# Patient Record
Sex: MALE | Race: WHITE | Employment: OTHER | URBAN - METROPOLITAN AREA
[De-identification: names, ages, dates, MRNs, and addresses within clinical notes are randomized per-mention and may not be internally consistent; named-entity substitution may affect disease eponyms.]

---

## 2017-06-06 ENCOUNTER — OFFICE VISIT (OUTPATIENT)
Dept: INTERNAL MEDICINE CLINIC | Age: 52
End: 2017-06-06

## 2017-06-06 VITALS
SYSTOLIC BLOOD PRESSURE: 126 MMHG | WEIGHT: 249.6 LBS | DIASTOLIC BLOOD PRESSURE: 74 MMHG | HEIGHT: 70 IN | HEART RATE: 60 BPM | BODY MASS INDEX: 35.73 KG/M2

## 2017-06-06 DIAGNOSIS — R73.09 ABNORMAL GLUCOSE: ICD-10-CM

## 2017-06-06 DIAGNOSIS — Z23 NEED FOR PROPHYLACTIC VACCINATION AGAINST DIPHTHERIA-TETANUS-PERTUSSIS (DTP): ICD-10-CM

## 2017-06-06 DIAGNOSIS — I10 ESSENTIAL HYPERTENSION, BENIGN: Primary | ICD-10-CM

## 2017-06-06 DIAGNOSIS — E78.00 HYPERCHOLESTEREMIA: ICD-10-CM

## 2017-06-06 LAB
ALBUMIN SERPL-MCNC: 4.7 G/DL (ref 3.4–5)
ANION GAP SERPL CALCULATED.3IONS-SCNC: 15 MMOL/L (ref 3–16)
BUN BLDV-MCNC: 18 MG/DL (ref 7–20)
CALCIUM SERPL-MCNC: 9.7 MG/DL (ref 8.3–10.6)
CHLORIDE BLD-SCNC: 100 MMOL/L (ref 99–110)
CHOLESTEROL, TOTAL: 164 MG/DL (ref 0–199)
CO2: 24 MMOL/L (ref 21–32)
CREAT SERPL-MCNC: 0.8 MG/DL (ref 0.9–1.3)
GFR AFRICAN AMERICAN: >60
GFR NON-AFRICAN AMERICAN: >60
GLUCOSE BLD-MCNC: 96 MG/DL (ref 70–99)
HDLC SERPL-MCNC: 35 MG/DL (ref 40–60)
LDL CHOLESTEROL CALCULATED: 101 MG/DL
PHOSPHORUS: 2.7 MG/DL (ref 2.5–4.9)
POTASSIUM SERPL-SCNC: 4.8 MMOL/L (ref 3.5–5.1)
SODIUM BLD-SCNC: 139 MMOL/L (ref 136–145)
TRIGL SERPL-MCNC: 142 MG/DL (ref 0–150)
VLDLC SERPL CALC-MCNC: 28 MG/DL

## 2017-06-06 PROCEDURE — 90471 IMMUNIZATION ADMIN: CPT | Performed by: INTERNAL MEDICINE

## 2017-06-06 PROCEDURE — 3017F COLORECTAL CA SCREEN DOC REV: CPT | Performed by: INTERNAL MEDICINE

## 2017-06-06 PROCEDURE — G8427 DOCREV CUR MEDS BY ELIG CLIN: HCPCS | Performed by: INTERNAL MEDICINE

## 2017-06-06 PROCEDURE — 99214 OFFICE O/P EST MOD 30 MIN: CPT | Performed by: INTERNAL MEDICINE

## 2017-06-06 PROCEDURE — 4004F PT TOBACCO SCREEN RCVD TLK: CPT | Performed by: INTERNAL MEDICINE

## 2017-06-06 PROCEDURE — 90715 TDAP VACCINE 7 YRS/> IM: CPT | Performed by: INTERNAL MEDICINE

## 2017-06-06 PROCEDURE — G8419 CALC BMI OUT NRM PARAM NOF/U: HCPCS | Performed by: INTERNAL MEDICINE

## 2017-06-06 RX ORDER — LISINOPRIL 10 MG/1
10 TABLET ORAL DAILY
Qty: 30 TABLET | Refills: 5 | Status: SHIPPED | OUTPATIENT
Start: 2017-06-06 | End: 2017-11-02 | Stop reason: SDUPTHER

## 2017-06-06 RX ORDER — DOCUSATE SODIUM 100 MG/1
100 CAPSULE, LIQUID FILLED ORAL EVERY OTHER DAY
COMMUNITY

## 2017-06-06 RX ORDER — ATORVASTATIN CALCIUM 20 MG/1
20 TABLET, FILM COATED ORAL NIGHTLY
Qty: 30 TABLET | Refills: 3 | Status: SHIPPED | OUTPATIENT
Start: 2017-06-06 | End: 2017-08-31 | Stop reason: SDUPTHER

## 2017-06-06 ASSESSMENT — PATIENT HEALTH QUESTIONNAIRE - PHQ9
SUM OF ALL RESPONSES TO PHQ9 QUESTIONS 1 & 2: 1
SUM OF ALL RESPONSES TO PHQ QUESTIONS 1-9: 1
1. LITTLE INTEREST OR PLEASURE IN DOING THINGS: 0
2. FEELING DOWN, DEPRESSED OR HOPELESS: 1
SUM OF ALL RESPONSES TO PHQ QUESTIONS 1-9: 1
2. FEELING DOWN, DEPRESSED OR HOPELESS: 1
SUM OF ALL RESPONSES TO PHQ9 QUESTIONS 1 & 2: 1
1. LITTLE INTEREST OR PLEASURE IN DOING THINGS: 0

## 2017-06-07 LAB
ESTIMATED AVERAGE GLUCOSE: 119.8 MG/DL
HBA1C MFR BLD: 5.8 %

## 2017-08-31 RX ORDER — ATORVASTATIN CALCIUM 20 MG/1
TABLET, FILM COATED ORAL
Qty: 30 TABLET | Refills: 2 | Status: SHIPPED | OUTPATIENT
Start: 2017-08-31 | End: 2017-12-12 | Stop reason: SDUPTHER

## 2017-10-10 PROCEDURE — G0008 ADMIN INFLUENZA VIRUS VAC: HCPCS | Performed by: INTERNAL MEDICINE

## 2017-10-10 PROCEDURE — 90686 IIV4 VACC NO PRSV 0.5 ML IM: CPT | Performed by: INTERNAL MEDICINE

## 2017-10-31 ENCOUNTER — IMMUNIZATION (OUTPATIENT)
Dept: INTERNAL MEDICINE CLINIC | Age: 52
End: 2017-10-31

## 2017-11-02 RX ORDER — LISINOPRIL 10 MG/1
10 TABLET ORAL DAILY
Qty: 30 TABLET | Refills: 5 | Status: SHIPPED | OUTPATIENT
Start: 2017-11-02 | End: 2018-05-24 | Stop reason: SDUPTHER

## 2017-12-12 ENCOUNTER — OFFICE VISIT (OUTPATIENT)
Dept: INTERNAL MEDICINE CLINIC | Age: 52
End: 2017-12-12

## 2017-12-12 VITALS
DIASTOLIC BLOOD PRESSURE: 74 MMHG | SYSTOLIC BLOOD PRESSURE: 122 MMHG | HEART RATE: 60 BPM | WEIGHT: 246.8 LBS | BODY MASS INDEX: 35.33 KG/M2 | HEIGHT: 70 IN

## 2017-12-12 DIAGNOSIS — K59.04 CHRONIC IDIOPATHIC CONSTIPATION: ICD-10-CM

## 2017-12-12 DIAGNOSIS — R73.09 ABNORMAL GLUCOSE: ICD-10-CM

## 2017-12-12 DIAGNOSIS — E78.2 MIXED HYPERLIPIDEMIA: ICD-10-CM

## 2017-12-12 DIAGNOSIS — I10 ESSENTIAL HYPERTENSION, BENIGN: Primary | ICD-10-CM

## 2017-12-12 LAB — HBA1C MFR BLD: 6 %

## 2017-12-12 PROCEDURE — 4004F PT TOBACCO SCREEN RCVD TLK: CPT | Performed by: INTERNAL MEDICINE

## 2017-12-12 PROCEDURE — G8484 FLU IMMUNIZE NO ADMIN: HCPCS | Performed by: INTERNAL MEDICINE

## 2017-12-12 PROCEDURE — 99214 OFFICE O/P EST MOD 30 MIN: CPT | Performed by: INTERNAL MEDICINE

## 2017-12-12 PROCEDURE — 3017F COLORECTAL CA SCREEN DOC REV: CPT | Performed by: INTERNAL MEDICINE

## 2017-12-12 PROCEDURE — G8417 CALC BMI ABV UP PARAM F/U: HCPCS | Performed by: INTERNAL MEDICINE

## 2017-12-12 PROCEDURE — 83036 HEMOGLOBIN GLYCOSYLATED A1C: CPT | Performed by: INTERNAL MEDICINE

## 2017-12-12 PROCEDURE — G8427 DOCREV CUR MEDS BY ELIG CLIN: HCPCS | Performed by: INTERNAL MEDICINE

## 2017-12-12 RX ORDER — ATORVASTATIN CALCIUM 20 MG/1
20 TABLET, FILM COATED ORAL DAILY
Qty: 30 TABLET | Refills: 5 | Status: SHIPPED | OUTPATIENT
Start: 2017-12-12 | End: 2018-06-12 | Stop reason: SDUPTHER

## 2017-12-12 NOTE — PROGRESS NOTES
CHOLHDLRATIO   The 10-year ASCVD risk score (Mary Sanchez, et al., 2013) is: 10.2%    Values used to calculate the score:      Age: 46 years      Sex: Male      Is Non- : No      Diabetic: No      Tobacco smoker: Yes      Systolic Blood Pressure: 577 mmHg      Is BP treated: Yes      HDL Cholesterol: 35 mg/dL      Total Cholesterol: 164 mg/dL     Colonoscopy done 6/16/16: 3 polyps removed: 2 adenomata, 1 hyperplastic    A/P:  1. Abnormal glucose  At risk for DM. Continue metformin      2. Essential hypertension, benign  Controlled  The current medical regimen is effective;  continue present plan and medications. BW is UTD    3. Mixed hyperlipidemia  Doing well on atorvastatin  The current medical regimen is effective;  continue present plan and medications. Tobacco cessation advised    4. Chronic idiopathic constipation  Colonoscopy from 2016 is reassuring. Patient Instructions   For constipation take 100mg Colace twice daily and take Miralax (Polyethylene glycol) daily as needed. Also drink lots of water.        RTO 6 months or PRN

## 2018-01-24 ENCOUNTER — OFFICE VISIT (OUTPATIENT)
Dept: INTERNAL MEDICINE CLINIC | Age: 53
End: 2018-01-24

## 2018-01-24 VITALS
TEMPERATURE: 98 F | BODY MASS INDEX: 34.7 KG/M2 | SYSTOLIC BLOOD PRESSURE: 100 MMHG | HEART RATE: 84 BPM | WEIGHT: 242.4 LBS | HEIGHT: 70 IN | DIASTOLIC BLOOD PRESSURE: 78 MMHG

## 2018-01-24 DIAGNOSIS — J00 ACUTE NASOPHARYNGITIS: Primary | ICD-10-CM

## 2018-01-24 DIAGNOSIS — J40 BRONCHITIS: ICD-10-CM

## 2018-01-24 LAB
INFLUENZA A ANTIBODY: NORMAL
INFLUENZA B ANTIBODY: NORMAL

## 2018-01-24 PROCEDURE — G8417 CALC BMI ABV UP PARAM F/U: HCPCS | Performed by: NURSE PRACTITIONER

## 2018-01-24 PROCEDURE — G8427 DOCREV CUR MEDS BY ELIG CLIN: HCPCS | Performed by: NURSE PRACTITIONER

## 2018-01-24 PROCEDURE — 87804 INFLUENZA ASSAY W/OPTIC: CPT | Performed by: NURSE PRACTITIONER

## 2018-01-24 PROCEDURE — 99213 OFFICE O/P EST LOW 20 MIN: CPT | Performed by: NURSE PRACTITIONER

## 2018-01-24 PROCEDURE — 3017F COLORECTAL CA SCREEN DOC REV: CPT | Performed by: NURSE PRACTITIONER

## 2018-01-24 PROCEDURE — 4004F PT TOBACCO SCREEN RCVD TLK: CPT | Performed by: NURSE PRACTITIONER

## 2018-01-24 PROCEDURE — G8484 FLU IMMUNIZE NO ADMIN: HCPCS | Performed by: NURSE PRACTITIONER

## 2018-01-24 RX ORDER — GUAIFENESIN AND CODEINE PHOSPHATE 100; 10 MG/5ML; MG/5ML
10 SOLUTION ORAL 3 TIMES DAILY PRN
Qty: 210 ML | Refills: 0 | Status: SHIPPED | OUTPATIENT
Start: 2018-01-24 | End: 2018-01-31

## 2018-01-24 ASSESSMENT — ENCOUNTER SYMPTOMS
SORE THROAT: 1
COUGH: 1
CHEST TIGHTNESS: 1

## 2018-06-12 ENCOUNTER — OFFICE VISIT (OUTPATIENT)
Dept: INTERNAL MEDICINE CLINIC | Age: 53
End: 2018-06-12

## 2018-06-12 VITALS
HEIGHT: 70 IN | DIASTOLIC BLOOD PRESSURE: 64 MMHG | WEIGHT: 248 LBS | BODY MASS INDEX: 35.5 KG/M2 | HEART RATE: 72 BPM | SYSTOLIC BLOOD PRESSURE: 112 MMHG

## 2018-06-12 DIAGNOSIS — R73.09 ABNORMAL GLUCOSE: Primary | ICD-10-CM

## 2018-06-12 DIAGNOSIS — E78.2 MIXED HYPERLIPIDEMIA: ICD-10-CM

## 2018-06-12 DIAGNOSIS — I10 ESSENTIAL HYPERTENSION, BENIGN: ICD-10-CM

## 2018-06-12 LAB
ALBUMIN SERPL-MCNC: 4.7 G/DL (ref 3.4–5)
ANION GAP SERPL CALCULATED.3IONS-SCNC: 17 MMOL/L (ref 3–16)
BUN BLDV-MCNC: 21 MG/DL (ref 7–20)
CALCIUM SERPL-MCNC: 9.7 MG/DL (ref 8.3–10.6)
CHLORIDE BLD-SCNC: 98 MMOL/L (ref 99–110)
CHOLESTEROL, TOTAL: 107 MG/DL (ref 0–199)
CO2: 22 MMOL/L (ref 21–32)
CREAT SERPL-MCNC: 1 MG/DL (ref 0.9–1.3)
GFR AFRICAN AMERICAN: >60
GFR NON-AFRICAN AMERICAN: >60
GLUCOSE BLD-MCNC: 134 MG/DL (ref 70–99)
HDLC SERPL-MCNC: 38 MG/DL (ref 40–60)
LDL CHOLESTEROL CALCULATED: 48 MG/DL
PHOSPHORUS: 2.2 MG/DL (ref 2.5–4.9)
POTASSIUM SERPL-SCNC: 5.1 MMOL/L (ref 3.5–5.1)
SODIUM BLD-SCNC: 137 MMOL/L (ref 136–145)
TRIGL SERPL-MCNC: 103 MG/DL (ref 0–150)
VLDLC SERPL CALC-MCNC: 21 MG/DL

## 2018-06-12 PROCEDURE — G8417 CALC BMI ABV UP PARAM F/U: HCPCS | Performed by: INTERNAL MEDICINE

## 2018-06-12 PROCEDURE — G8427 DOCREV CUR MEDS BY ELIG CLIN: HCPCS | Performed by: INTERNAL MEDICINE

## 2018-06-12 PROCEDURE — 4004F PT TOBACCO SCREEN RCVD TLK: CPT | Performed by: INTERNAL MEDICINE

## 2018-06-12 PROCEDURE — 99214 OFFICE O/P EST MOD 30 MIN: CPT | Performed by: INTERNAL MEDICINE

## 2018-06-12 PROCEDURE — 3017F COLORECTAL CA SCREEN DOC REV: CPT | Performed by: INTERNAL MEDICINE

## 2018-06-12 RX ORDER — LISINOPRIL 10 MG/1
10 TABLET ORAL DAILY
Qty: 30 TABLET | Refills: 5 | Status: SHIPPED | OUTPATIENT
Start: 2018-06-12 | End: 2019-01-04 | Stop reason: SDUPTHER

## 2018-06-12 RX ORDER — ATORVASTATIN CALCIUM 20 MG/1
20 TABLET, FILM COATED ORAL DAILY
Qty: 30 TABLET | Refills: 5 | Status: SHIPPED | OUTPATIENT
Start: 2018-06-12 | End: 2019-01-04 | Stop reason: SDUPTHER

## 2018-06-13 LAB
ESTIMATED AVERAGE GLUCOSE: 128.4 MG/DL
HBA1C MFR BLD: 6.1 %

## 2018-08-28 ENCOUNTER — NURSE ONLY (OUTPATIENT)
Dept: INTERNAL MEDICINE CLINIC | Age: 53
End: 2018-08-28

## 2018-08-28 DIAGNOSIS — Z23 NEED FOR SHINGLES VACCINE: Primary | ICD-10-CM

## 2018-10-29 ENCOUNTER — IMMUNIZATION (OUTPATIENT)
Dept: INTERNAL MEDICINE CLINIC | Age: 53
End: 2018-10-29
Payer: MEDICARE

## 2018-10-29 DIAGNOSIS — Z23 NEED FOR INFLUENZA VACCINATION: Primary | ICD-10-CM

## 2018-10-29 PROCEDURE — 90682 RIV4 VACC RECOMBINANT DNA IM: CPT | Performed by: INTERNAL MEDICINE

## 2018-10-29 PROCEDURE — G0008 ADMIN INFLUENZA VIRUS VAC: HCPCS | Performed by: INTERNAL MEDICINE

## 2018-11-09 ENCOUNTER — NURSE ONLY (OUTPATIENT)
Dept: INTERNAL MEDICINE CLINIC | Age: 53
End: 2018-11-09
Payer: MEDICARE

## 2018-11-09 DIAGNOSIS — Z23 NEED FOR SHINGLES VACCINE: Primary | ICD-10-CM

## 2018-11-09 PROCEDURE — 90471 IMMUNIZATION ADMIN: CPT | Performed by: INTERNAL MEDICINE

## 2018-11-09 PROCEDURE — 90750 HZV VACC RECOMBINANT IM: CPT | Performed by: INTERNAL MEDICINE

## 2018-12-03 ENCOUNTER — TELEPHONE (OUTPATIENT)
Dept: INTERNAL MEDICINE CLINIC | Age: 53
End: 2018-12-03

## 2019-01-04 RX ORDER — LISINOPRIL 10 MG/1
TABLET ORAL
Qty: 30 TABLET | Refills: 5 | Status: SHIPPED | OUTPATIENT
Start: 2019-01-04 | End: 2019-06-27 | Stop reason: SDUPTHER

## 2019-01-04 RX ORDER — ATORVASTATIN CALCIUM 20 MG/1
TABLET, FILM COATED ORAL
Qty: 30 TABLET | Refills: 5 | Status: SHIPPED | OUTPATIENT
Start: 2019-01-04 | End: 2019-06-27 | Stop reason: SDUPTHER

## 2019-01-08 ENCOUNTER — OFFICE VISIT (OUTPATIENT)
Dept: INTERNAL MEDICINE CLINIC | Age: 54
End: 2019-01-08
Payer: MEDICARE

## 2019-01-08 VITALS
HEIGHT: 70 IN | SYSTOLIC BLOOD PRESSURE: 122 MMHG | DIASTOLIC BLOOD PRESSURE: 80 MMHG | WEIGHT: 250.8 LBS | BODY MASS INDEX: 35.9 KG/M2 | HEART RATE: 68 BPM

## 2019-01-08 DIAGNOSIS — E78.2 MIXED HYPERLIPIDEMIA: ICD-10-CM

## 2019-01-08 DIAGNOSIS — R73.09 ABNORMAL GLUCOSE: Primary | ICD-10-CM

## 2019-01-08 DIAGNOSIS — I10 ESSENTIAL HYPERTENSION, BENIGN: ICD-10-CM

## 2019-01-08 LAB — HBA1C MFR BLD: 6.2 %

## 2019-01-08 PROCEDURE — G8482 FLU IMMUNIZE ORDER/ADMIN: HCPCS | Performed by: INTERNAL MEDICINE

## 2019-01-08 PROCEDURE — G8417 CALC BMI ABV UP PARAM F/U: HCPCS | Performed by: INTERNAL MEDICINE

## 2019-01-08 PROCEDURE — 99214 OFFICE O/P EST MOD 30 MIN: CPT | Performed by: INTERNAL MEDICINE

## 2019-01-08 PROCEDURE — 83036 HEMOGLOBIN GLYCOSYLATED A1C: CPT | Performed by: INTERNAL MEDICINE

## 2019-01-08 PROCEDURE — 4004F PT TOBACCO SCREEN RCVD TLK: CPT | Performed by: INTERNAL MEDICINE

## 2019-01-08 PROCEDURE — G8427 DOCREV CUR MEDS BY ELIG CLIN: HCPCS | Performed by: INTERNAL MEDICINE

## 2019-01-08 PROCEDURE — G8510 SCR DEP NEG, NO PLAN REQD: HCPCS | Performed by: INTERNAL MEDICINE

## 2019-01-08 PROCEDURE — 3017F COLORECTAL CA SCREEN DOC REV: CPT | Performed by: INTERNAL MEDICINE

## 2019-01-08 ASSESSMENT — PATIENT HEALTH QUESTIONNAIRE - PHQ9
2. FEELING DOWN, DEPRESSED OR HOPELESS: 1
SUM OF ALL RESPONSES TO PHQ9 QUESTIONS 1 & 2: 1
SUM OF ALL RESPONSES TO PHQ QUESTIONS 1-9: 1
1. LITTLE INTEREST OR PLEASURE IN DOING THINGS: 0
SUM OF ALL RESPONSES TO PHQ QUESTIONS 1-9: 1

## 2019-05-03 ENCOUNTER — HOSPITAL ENCOUNTER (OUTPATIENT)
Age: 54
Discharge: HOME | End: 2019-05-03
Payer: MEDICARE

## 2019-05-03 ENCOUNTER — HOSPITAL ENCOUNTER (OUTPATIENT)
Dept: GENERAL RADIOLOGY | Age: 54
Discharge: HOME | End: 2019-05-03
Payer: MEDICARE

## 2019-05-03 ENCOUNTER — OFFICE VISIT (OUTPATIENT)
Dept: INTERNAL MEDICINE CLINIC | Age: 54
End: 2019-05-03
Payer: MEDICARE

## 2019-05-03 VITALS
SYSTOLIC BLOOD PRESSURE: 130 MMHG | BODY MASS INDEX: 36.94 KG/M2 | HEART RATE: 80 BPM | HEIGHT: 70 IN | WEIGHT: 258 LBS | DIASTOLIC BLOOD PRESSURE: 82 MMHG

## 2019-05-03 DIAGNOSIS — M79.672 LEFT FOOT PAIN: ICD-10-CM

## 2019-05-03 DIAGNOSIS — M79.672 LEFT FOOT PAIN: Primary | ICD-10-CM

## 2019-05-03 PROCEDURE — 3017F COLORECTAL CA SCREEN DOC REV: CPT | Performed by: NURSE PRACTITIONER

## 2019-05-03 PROCEDURE — 99213 OFFICE O/P EST LOW 20 MIN: CPT | Performed by: NURSE PRACTITIONER

## 2019-05-03 PROCEDURE — G8427 DOCREV CUR MEDS BY ELIG CLIN: HCPCS | Performed by: NURSE PRACTITIONER

## 2019-05-03 PROCEDURE — 4004F PT TOBACCO SCREEN RCVD TLK: CPT | Performed by: NURSE PRACTITIONER

## 2019-05-03 PROCEDURE — 73630 X-RAY EXAM OF FOOT: CPT

## 2019-05-03 PROCEDURE — G8417 CALC BMI ABV UP PARAM F/U: HCPCS | Performed by: NURSE PRACTITIONER

## 2019-05-03 RX ORDER — CALCIUM POLYCARBOPHIL 625 MG 625 MG/1
625 TABLET ORAL DAILY
Status: ON HOLD | COMMUNITY
End: 2021-02-19

## 2019-07-02 ENCOUNTER — OFFICE VISIT (OUTPATIENT)
Dept: INTERNAL MEDICINE CLINIC | Age: 54
End: 2019-07-02
Payer: MEDICARE

## 2019-07-02 VITALS
HEART RATE: 68 BPM | BODY MASS INDEX: 35.65 KG/M2 | DIASTOLIC BLOOD PRESSURE: 68 MMHG | HEIGHT: 70 IN | WEIGHT: 249 LBS | SYSTOLIC BLOOD PRESSURE: 112 MMHG

## 2019-07-02 DIAGNOSIS — I10 ESSENTIAL HYPERTENSION, BENIGN: ICD-10-CM

## 2019-07-02 DIAGNOSIS — M79.672 LEFT FOOT PAIN: ICD-10-CM

## 2019-07-02 DIAGNOSIS — E78.2 MIXED HYPERLIPIDEMIA: ICD-10-CM

## 2019-07-02 DIAGNOSIS — R73.09 ABNORMAL GLUCOSE: Primary | ICD-10-CM

## 2019-07-02 LAB
ALBUMIN SERPL-MCNC: 4.7 G/DL (ref 3.4–5)
ANION GAP SERPL CALCULATED.3IONS-SCNC: 14 MMOL/L (ref 3–16)
BUN BLDV-MCNC: 20 MG/DL (ref 7–20)
CALCIUM SERPL-MCNC: 9.8 MG/DL (ref 8.3–10.6)
CHLORIDE BLD-SCNC: 98 MMOL/L (ref 99–110)
CHOLESTEROL, TOTAL: 97 MG/DL (ref 0–199)
CO2: 24 MMOL/L (ref 21–32)
CREAT SERPL-MCNC: 0.9 MG/DL (ref 0.9–1.3)
GFR AFRICAN AMERICAN: >60
GFR NON-AFRICAN AMERICAN: >60
GLUCOSE BLD-MCNC: 114 MG/DL (ref 70–99)
HDLC SERPL-MCNC: 31 MG/DL (ref 40–60)
LDL CHOLESTEROL CALCULATED: 40 MG/DL
PHOSPHORUS: 2.4 MG/DL (ref 2.5–4.9)
POTASSIUM SERPL-SCNC: 4.4 MMOL/L (ref 3.5–5.1)
SODIUM BLD-SCNC: 136 MMOL/L (ref 136–145)
TRIGL SERPL-MCNC: 132 MG/DL (ref 0–150)
VLDLC SERPL CALC-MCNC: 26 MG/DL

## 2019-07-02 PROCEDURE — G8427 DOCREV CUR MEDS BY ELIG CLIN: HCPCS | Performed by: INTERNAL MEDICINE

## 2019-07-02 PROCEDURE — G8417 CALC BMI ABV UP PARAM F/U: HCPCS | Performed by: INTERNAL MEDICINE

## 2019-07-02 PROCEDURE — 3017F COLORECTAL CA SCREEN DOC REV: CPT | Performed by: INTERNAL MEDICINE

## 2019-07-02 PROCEDURE — 99214 OFFICE O/P EST MOD 30 MIN: CPT | Performed by: INTERNAL MEDICINE

## 2019-07-02 PROCEDURE — 4004F PT TOBACCO SCREEN RCVD TLK: CPT | Performed by: INTERNAL MEDICINE

## 2019-07-02 NOTE — PROGRESS NOTES
Chief Complaint   Patient presents with    Other     Abnl glucose    Hypertension    Hyperlipidemia   Left foot pain    HPI:  Pastora Salmeron is a 47 y.o. (: 1965) here today   for management of Abnormal glucose, hypertension, and hyperlipidemia    Abnl glucose: He is taking metformin 500 mg daily as directed. He has been exercising regularly, but could use more dietary discretion. HTN: The patient is tolerating blood pressure medication well and taking them as directed. BP control outside of the office is reported as not monitored. No symptoms concerning for end organ damage are present. HLD: He is taking atorvastatin daily as directed. He denies any side effects and tolerates well. Left foot pain. Onset about 3 months ago. Aggravated by standing for a long time  Quality: stinging  Severity currently is 2/10; up to 9 or 10 at times. Alleviated with- none known. He has tried ibuprofen and elevation. PFSH: Smoking ~ 8-9 cigs/day  Wants to quit      Social History     Tobacco Use    Smoking status: Current Every Day Smoker     Packs/day: 1.00     Types: Cigarettes    Smokeless tobacco: Never Used   Substance Use Topics    Alcohol use: No     Alcohol/week: 0.0 oz    Drug use: No        ROS:  CV: Neg for chest pain  RESP: neg for dyspnea   GI: Neg for constipation or diarrhea  : Neg for urinary problems       OBJECTIVE:    /68 (Site: Left Upper Arm, Position: Sitting, Cuff Size: Large Adult)   Pulse 68   Ht 5' 10\" (1.778 m)   Wt 249 lb (112.9 kg)   BMI 35.73 kg/m²   BP Readings from Last 2 Encounters:   19 112/68   19 130/82     Wt Readings from Last 3 Encounters:   19 249 lb (112.9 kg)   19 258 lb (117 kg)   19 250 lb 12.8 oz (113.8 kg)       GEN: WN/WD, NAD  CV: regular rate and rhythm, no murmurs rubs or gallops  Resp: normal effort, clear auscultation bilaterally  No peripheral edema   Abd: soft, nontender to palpation.    MSK: there is no

## 2019-07-03 LAB
ESTIMATED AVERAGE GLUCOSE: 128.4 MG/DL
HBA1C MFR BLD: 6.1 %

## 2019-10-14 ENCOUNTER — IMMUNIZATION (OUTPATIENT)
Dept: INTERNAL MEDICINE CLINIC | Age: 54
End: 2019-10-14
Payer: MEDICARE

## 2019-10-14 DIAGNOSIS — Z23 NEED FOR INFLUENZA VACCINATION: Primary | ICD-10-CM

## 2019-10-14 PROCEDURE — 90686 IIV4 VACC NO PRSV 0.5 ML IM: CPT | Performed by: INTERNAL MEDICINE

## 2019-10-14 PROCEDURE — G0008 ADMIN INFLUENZA VIRUS VAC: HCPCS | Performed by: INTERNAL MEDICINE

## 2019-12-31 RX ORDER — ATORVASTATIN CALCIUM 20 MG/1
TABLET, FILM COATED ORAL
Qty: 30 TABLET | Refills: 5 | Status: SHIPPED | OUTPATIENT
Start: 2019-12-31 | End: 2020-05-26

## 2019-12-31 RX ORDER — LISINOPRIL 10 MG/1
TABLET ORAL
Qty: 30 TABLET | Refills: 5 | Status: SHIPPED | OUTPATIENT
Start: 2019-12-31 | End: 2020-05-26

## 2020-01-06 ENCOUNTER — OFFICE VISIT (OUTPATIENT)
Dept: INTERNAL MEDICINE CLINIC | Age: 55
End: 2020-01-06
Payer: MEDICARE

## 2020-01-06 VITALS
HEIGHT: 70 IN | BODY MASS INDEX: 35.93 KG/M2 | HEART RATE: 68 BPM | WEIGHT: 251 LBS | DIASTOLIC BLOOD PRESSURE: 78 MMHG | SYSTOLIC BLOOD PRESSURE: 130 MMHG

## 2020-01-06 LAB — HBA1C MFR BLD: 5.9 %

## 2020-01-06 PROCEDURE — G8427 DOCREV CUR MEDS BY ELIG CLIN: HCPCS | Performed by: INTERNAL MEDICINE

## 2020-01-06 PROCEDURE — G8510 SCR DEP NEG, NO PLAN REQD: HCPCS | Performed by: INTERNAL MEDICINE

## 2020-01-06 PROCEDURE — 83036 HEMOGLOBIN GLYCOSYLATED A1C: CPT | Performed by: INTERNAL MEDICINE

## 2020-01-06 PROCEDURE — 3017F COLORECTAL CA SCREEN DOC REV: CPT | Performed by: INTERNAL MEDICINE

## 2020-01-06 PROCEDURE — 4004F PT TOBACCO SCREEN RCVD TLK: CPT | Performed by: INTERNAL MEDICINE

## 2020-01-06 PROCEDURE — 99213 OFFICE O/P EST LOW 20 MIN: CPT | Performed by: INTERNAL MEDICINE

## 2020-01-06 PROCEDURE — G8417 CALC BMI ABV UP PARAM F/U: HCPCS | Performed by: INTERNAL MEDICINE

## 2020-01-06 PROCEDURE — G8482 FLU IMMUNIZE ORDER/ADMIN: HCPCS | Performed by: INTERNAL MEDICINE

## 2020-01-06 SDOH — ECONOMIC STABILITY: TRANSPORTATION INSECURITY
IN THE PAST 12 MONTHS, HAS THE LACK OF TRANSPORTATION KEPT YOU FROM MEDICAL APPOINTMENTS OR FROM GETTING MEDICATIONS?: NO

## 2020-01-06 SDOH — ECONOMIC STABILITY: FOOD INSECURITY: WITHIN THE PAST 12 MONTHS, YOU WORRIED THAT YOUR FOOD WOULD RUN OUT BEFORE YOU GOT MONEY TO BUY MORE.: NEVER TRUE

## 2020-01-06 SDOH — ECONOMIC STABILITY: TRANSPORTATION INSECURITY
IN THE PAST 12 MONTHS, HAS LACK OF TRANSPORTATION KEPT YOU FROM MEETINGS, WORK, OR FROM GETTING THINGS NEEDED FOR DAILY LIVING?: NO

## 2020-01-06 SDOH — ECONOMIC STABILITY: FOOD INSECURITY: WITHIN THE PAST 12 MONTHS, THE FOOD YOU BOUGHT JUST DIDN'T LAST AND YOU DIDN'T HAVE MONEY TO GET MORE.: NEVER TRUE

## 2020-01-06 SDOH — ECONOMIC STABILITY: INCOME INSECURITY: HOW HARD IS IT FOR YOU TO PAY FOR THE VERY BASICS LIKE FOOD, HOUSING, MEDICAL CARE, AND HEATING?: NOT HARD AT ALL

## 2020-01-06 ASSESSMENT — PATIENT HEALTH QUESTIONNAIRE - PHQ9
2. FEELING DOWN, DEPRESSED OR HOPELESS: 0
1. LITTLE INTEREST OR PLEASURE IN DOING THINGS: 0
SUM OF ALL RESPONSES TO PHQ QUESTIONS 1-9: 0
SUM OF ALL RESPONSES TO PHQ QUESTIONS 1-9: 0
SUM OF ALL RESPONSES TO PHQ9 QUESTIONS 1 & 2: 0

## 2020-05-26 RX ORDER — LISINOPRIL 10 MG/1
TABLET ORAL
Qty: 90 TABLET | Refills: 1 | Status: SHIPPED | OUTPATIENT
Start: 2020-05-26 | End: 2020-12-31

## 2020-05-26 RX ORDER — ATORVASTATIN CALCIUM 20 MG/1
TABLET, FILM COATED ORAL
Qty: 90 TABLET | Refills: 1 | Status: SHIPPED | OUTPATIENT
Start: 2020-05-26 | End: 2020-12-31

## 2020-07-14 ENCOUNTER — OFFICE VISIT (OUTPATIENT)
Dept: INTERNAL MEDICINE CLINIC | Age: 55
End: 2020-07-14
Payer: MEDICARE

## 2020-07-14 VITALS
WEIGHT: 250 LBS | BODY MASS INDEX: 35.79 KG/M2 | SYSTOLIC BLOOD PRESSURE: 128 MMHG | HEIGHT: 70 IN | TEMPERATURE: 97.4 F | HEART RATE: 60 BPM | DIASTOLIC BLOOD PRESSURE: 80 MMHG

## 2020-07-14 LAB
ALBUMIN SERPL-MCNC: 4.3 G/DL (ref 3.4–5)
ANION GAP SERPL CALCULATED.3IONS-SCNC: 13 MMOL/L (ref 3–16)
BUN BLDV-MCNC: 15 MG/DL (ref 7–20)
CALCIUM SERPL-MCNC: 8.9 MG/DL (ref 8.3–10.6)
CHLORIDE BLD-SCNC: 103 MMOL/L (ref 99–110)
CHOLESTEROL, TOTAL: 112 MG/DL (ref 0–199)
CO2: 24 MMOL/L (ref 21–32)
CREAT SERPL-MCNC: 0.8 MG/DL (ref 0.9–1.3)
GFR AFRICAN AMERICAN: >60
GFR NON-AFRICAN AMERICAN: >60
GLUCOSE BLD-MCNC: 135 MG/DL (ref 70–99)
HBA1C MFR BLD: 6 %
HDLC SERPL-MCNC: 32 MG/DL (ref 40–60)
LDL CHOLESTEROL CALCULATED: 60 MG/DL
PHOSPHORUS: 2.3 MG/DL (ref 2.5–4.9)
POTASSIUM SERPL-SCNC: 4.2 MMOL/L (ref 3.5–5.1)
SODIUM BLD-SCNC: 140 MMOL/L (ref 136–145)
TRIGL SERPL-MCNC: 101 MG/DL (ref 0–150)
VLDLC SERPL CALC-MCNC: 20 MG/DL

## 2020-07-14 PROCEDURE — 83036 HEMOGLOBIN GLYCOSYLATED A1C: CPT | Performed by: INTERNAL MEDICINE

## 2020-07-14 PROCEDURE — 4004F PT TOBACCO SCREEN RCVD TLK: CPT | Performed by: INTERNAL MEDICINE

## 2020-07-14 PROCEDURE — G8417 CALC BMI ABV UP PARAM F/U: HCPCS | Performed by: INTERNAL MEDICINE

## 2020-07-14 PROCEDURE — 3017F COLORECTAL CA SCREEN DOC REV: CPT | Performed by: INTERNAL MEDICINE

## 2020-07-14 PROCEDURE — 99214 OFFICE O/P EST MOD 30 MIN: CPT | Performed by: INTERNAL MEDICINE

## 2020-07-14 PROCEDURE — G8427 DOCREV CUR MEDS BY ELIG CLIN: HCPCS | Performed by: INTERNAL MEDICINE

## 2020-07-14 NOTE — PROGRESS NOTES
Chief Complaint   Patient presents with    Blood Sugar Problem     abnl glucose     Hypertension    Hyperlipidemia        HPI:  Shannan Mcneil is a 54 y.o. (: 1965) here today   for management of Abnormal glucose, hypertension, and hyperlipidemia    Abnl glucose: He is taking metformin 500 mg daily as directed. He reports adherence to lifestyle recommendations, specifically healthy diet and regular activity. HTN: The patient is tolerating blood pressure medication well and taking them as directed. BP control outside of the office is reported as not monitored. No symptoms concerning for end organ damage are present. HLD: He is taking atorvastatin daily as directed. He denies any side effects and tolerates well. PFSH: 8-9 cigs/day  :Pre contemplative for quitting.  COVID-19 has been a barrier      Social History     Tobacco Use    Smoking status: Current Every Day Smoker     Packs/day: 1.00     Types: Cigarettes    Smokeless tobacco: Never Used   Substance Use Topics    Alcohol use: No     Alcohol/week: 0.0 standard drinks    Drug use: No        ROS:  CV: Neg for chest pain  RESP: neg for dyspnea   : Neg for urinary problems       OBJECTIVE:    /80   Pulse 60   Temp 97.4 °F (36.3 °C) (Temporal)   Ht 5' 10\" (1.778 m)   Wt 250 lb (113.4 kg)   BMI 35.87 kg/m²     Wt Readings from Last 3 Encounters:   20 250 lb (113.4 kg)   20 251 lb (113.9 kg)   19 249 lb (112.9 kg)       GEN: WN/WD, NAD  CV: regular rate and rhythm, no murmurs rubs or gallops  Resp: normal effort, clear auscultation bilaterally  No peripheral edema           Lab Results   Component Value Date    CREATININE 0.9 2019    BUN 20 2019     2019    K 4.4 2019    CL 98 (L) 2019    CO2 24 2019      Lab Results   Component Value Date    TSH 1.49 2013      Lab Results   Component Value Date    LABA1C 5.9 2020        Lab Results   Component Value Date    CHOL 97 07/02/2019    CHOL 107 06/12/2018    CHOL 164 06/06/2017     Lab Results   Component Value Date    TRIG 132 07/02/2019    TRIG 103 06/12/2018    TRIG 142 06/06/2017     Lab Results   Component Value Date    HDL 31 (L) 07/02/2019    HDL 38 (L) 06/12/2018    HDL 35 (L) 06/06/2017     Lab Results   Component Value Date    LDLCALC 40 07/02/2019    LDLCALC 48 06/12/2018    LDLCALC 101 (H) 06/06/2017     Lab Results   Component Value Date    LABVLDL 26 07/02/2019    LABVLDL 21 06/12/2018    LABVLDL 28 06/06/2017     No results found for: CHOLHDLRATIO             ASSESSMENT/PLAN:     1. Abnormal glucose  Stable and controlled  Continue metforming  - POCT glycosylated hemoglobin (Hb A1C)    2. Essential hypertension, benign  Chronic and stable, well controlled  The current medical regimen is effective;  continue present plan and medications. - Renal Function Panel    3. Mixed hyperlipidemia  Stable, controlled  The current medical regimen is effective;  continue present plan and medications.    - Lipid Panel             RTO in 6 months

## 2020-10-21 ENCOUNTER — NURSE ONLY (OUTPATIENT)
Dept: INTERNAL MEDICINE CLINIC | Age: 55
End: 2020-10-21
Payer: MEDICARE

## 2020-10-21 PROCEDURE — G0008 ADMIN INFLUENZA VIRUS VAC: HCPCS | Performed by: INTERNAL MEDICINE

## 2020-10-21 PROCEDURE — 90686 IIV4 VACC NO PRSV 0.5 ML IM: CPT | Performed by: INTERNAL MEDICINE

## 2020-12-31 RX ORDER — LISINOPRIL 10 MG/1
TABLET ORAL
Qty: 90 TABLET | Refills: 1 | Status: SHIPPED | OUTPATIENT
Start: 2020-12-31 | End: 2021-07-19 | Stop reason: SDUPTHER

## 2020-12-31 RX ORDER — ATORVASTATIN CALCIUM 20 MG/1
TABLET, FILM COATED ORAL
Qty: 90 TABLET | Refills: 1 | Status: ON HOLD
Start: 2020-12-31 | End: 2021-02-22 | Stop reason: HOSPADM

## 2021-01-19 ENCOUNTER — OFFICE VISIT (OUTPATIENT)
Dept: INTERNAL MEDICINE CLINIC | Age: 56
End: 2021-01-19
Payer: MEDICARE

## 2021-01-19 VITALS
WEIGHT: 252 LBS | DIASTOLIC BLOOD PRESSURE: 72 MMHG | SYSTOLIC BLOOD PRESSURE: 126 MMHG | TEMPERATURE: 96.9 F | BODY MASS INDEX: 36.08 KG/M2 | HEART RATE: 68 BPM | HEIGHT: 70 IN

## 2021-01-19 DIAGNOSIS — F33.8 SEASONAL AFFECTIVE DISORDER (HCC): ICD-10-CM

## 2021-01-19 DIAGNOSIS — Z00.00 ROUTINE GENERAL MEDICAL EXAMINATION AT A HEALTH CARE FACILITY: Primary | ICD-10-CM

## 2021-01-19 PROCEDURE — G8482 FLU IMMUNIZE ORDER/ADMIN: HCPCS | Performed by: INTERNAL MEDICINE

## 2021-01-19 PROCEDURE — G0438 PPPS, INITIAL VISIT: HCPCS | Performed by: INTERNAL MEDICINE

## 2021-01-19 PROCEDURE — 3017F COLORECTAL CA SCREEN DOC REV: CPT | Performed by: INTERNAL MEDICINE

## 2021-01-19 ASSESSMENT — LIFESTYLE VARIABLES: HOW OFTEN DO YOU HAVE A DRINK CONTAINING ALCOHOL: 0

## 2021-01-19 ASSESSMENT — PATIENT HEALTH QUESTIONNAIRE - PHQ9
SUM OF ALL RESPONSES TO PHQ9 QUESTIONS 1 & 2: 2
SUM OF ALL RESPONSES TO PHQ QUESTIONS 1-9: 2

## 2021-01-19 NOTE — PATIENT INSTRUCTIONS
· When exposed to the sun, use a sunscreen that protects against both UVA and UVB radiation with an SPF of 30 or greater. Reapply every 2 to 3 hours or after sweating, drying off with a towel, or swimming. · Always wear a seat belt when traveling in a car. Always wear a helmet when riding a bicycle or motorcycle.

## 2021-01-19 NOTE — PROGRESS NOTES
Medicare Annual Wellness Visit  Name: Chacho Hicks Date: 2021   MRN: [id number] Sex: Male   Age: 54 y.o. Ethnicity: Non-/Non    : 1965 Race: Ambar Pringle is here for Medicare AWV    Screenings for behavioral, psychosocial and functional/safety risks, and cognitive dysfunction are all negative except as indicated below. These results, as well as other patient data from the [address] form, are documented in Flowsheets linked to this Encounter. Allergies   Allergen Reactions    Tylenol [Acetaminophen] Shortness Of Breath     Tylenol 3, makes him feel like hes having a heart attack, however can take Vicodin       Prior to Visit Medications    Medication Sig Taking?  Authorizing Provider   atorvastatin (LIPITOR) 20 MG tablet TAKE ONE TABLET BY MOUTH DAILY Yes Anam Baldwin MD   lisinopril (PRINIVIL;ZESTRIL) 10 MG tablet TAKE ONE TABLET BY MOUTH DAILY Yes Anam Baldwin MD   metFORMIN (GLUCOPHAGE) 500 MG tablet TAKE ONE TABLET BY MOUTH DAILY WITH BREAKFAST Yes Anam Baldwin MD   polycarbophil (FIBERCON) 625 MG tablet Take 625 mg by mouth daily Yes Historical Provider, MD   docusate sodium (COLACE) 100 MG capsule Take 100 mg by mouth daily  Yes Historical Provider, MD   aspirin EC 81 MG EC tablet Take 1 tablet by mouth daily Yes Brandi Tran MD       Past Medical History:   Diagnosis Date    Back pain     Congenital deafness     Deaf     Hypercholesteremia     Hyperglycemia     ADA (obstructive sleep apnea)     PONV (postoperative nausea and vomiting)     Prolonged emergence from general anesthesia        Past Surgical History:   Procedure Laterality Date   [address] [facility]    x2    [address]  7/1/15    mff    KIDNEY STONE SURGERY         Family History   Problem Relation Age of Onset    Cancer Other         lung    Diabetes Mother     Kidney Disease Father         39 y/o  of kidney disease CareTeam (Including outside providers/suppliers regularly involved in providing care):   Patient Care Team:  Anam Baldwin MD as PCP - General (Internal Medicine)  Anam Baldwin MD as PCP - [facility] Empaneled Provider  Mic Boyd MD as Consulting Physician (General Surgery)    Wt Readings from Last 3 Encounters:   01/19/21 252 lb (114.3 kg)   07/14/20 250 lb (113.4 kg)   01/06/20 251 lb (113.9 kg)     Vitals:    01/19/21 1037   BP: 126/72   Pulse: 68   Temp: 96.9 °F (36.1 °C)   TempSrc: Temporal   Weight: 252 lb (114.3 kg)   Height: 5' 10\" (1.778 m)     Body mass index is 36.16 kg/m². Based upon direct observation of the patient, evaluation of cognition reveals recent and remote memory intact. Patient's complete Health Risk Assessment and screening values have been reviewed and are found in Flowsheets. The following problems were reviewed today and where indicated follow up appointments were made and/or referrals ordered. Positive Risk Factor Screenings with Interventions:         Substance History:  Social History     Tobacco History     Smoking Status  Current Every Day Smoker Smoking Frequency  1 pack/day Smoking Tobacco Type  Cigarettes    Smokeless Tobacco Use  Never Used          Alcohol History     Alcohol Use Status  No          Drug Use     Drug Use Status  No          Sexual Activity     Sexually Active  Yes               Alcohol Screening:       A score of 8 or more is associated with harmful or hazardous drinking. A score of 13 or more in women, and 15 or more in men, is likely to indicate alcohol dependence. Substance Abuse Interventions:  · Tobacco abuse:  tobacco cessation tips and resources provided    General Health and ACP:  General  In general, how would you say your health is?: Good  In the past 7 days, have you experienced any of the following?  New or Increased Pain, New or Increased Fatigue, Loneliness, Social Isolation, Stress or Anger?: (!) Stress  Do you get the social and emotional support that you need?: (!) No  Do you have a Living Will?: (!) No  Advance Directives     Power of Maryjane Weiss Will ACP-Advance Directive ACP-Power of     Not on File Not on File Not on File Not on File      General Health Risk Interventions:  · Stress: patient declines any further evaluation/treatment for this issue   · ACP discussion with patient today.  Paperwork provided    Health Habits/Nutrition:  Health Habits/Nutrition  Do you exercise for at least 20 minutes 2-3 times per week?: Yes  Have you lost any weight without trying in the past 3 months?: No  Do you eat fewer than 2 meals per day?: (!) Yes  Have you seen a dentist within the past year?: (!) No  Body mass index: (!) 36.15  Health Habits/Nutrition Interventions:  · diet, exercise, weight managment recommendations discussed    Hearing/Vision:  No exam data present  Hearing/Vision  Do you or your family notice any trouble with your hearing?: (!) Yes  Do you have difficulty driving, watching TV, or doing any of your daily activities because of your eyesight?: No  Have you had an eye exam within the past year?: (!) No  Hearing/Vision Interventions:  · has hearing aids, vision exam advised    Safety:  Safety  Do you have working smoke detectors?: Yes  Have all throw rugs been removed or fastened?: (!) No  Do you have non-slip mats or surfaces in all bathtubs/showers?: Yes  Do all of your stairways have a railing or banister?: Yes  Are your doorways, halls and stairs free of clutter?: Yes  Do you always fasten your seatbelt when you are in a car?: Yes  Safety Interventions:  · Home safety tips provided     Personalized Preventive Plan   Current Health Maintenance Status  Immunization History   Administered Date(s) Administered    Influenza 11/13/2013    Influenza Vaccine, unspecified formulation 10/06/2015    Influenza Virus Vaccine 10/06/2015    Influenza Whole 10/23/2007    Influenza, Quadv, IM, (6 mo and older Fluzone, Flulaval, Fluarix and 3 yrs and older Afluria) 11/16/2016    Influenza, Rosan Pancoast, IM, PF (6 mo and older Fluzone, Flulaval, Fluarix, and 3 yrs and older Afluria) 10/10/2017, 10/14/2019, 10/21/2020    Influenza, Rosan Pancoast, Recombinant, IM PF (Flublok 18 yrs and older) 10/29/2018    Pneumococcal Polysaccharide ([id number]) 11/16/2016    Tdap (Boostrix, Adacel) 06/06/2017    Zoster Recombinant (Shingrix) 08/28/2018, 11/09/2018        Health Maintenance   Topic Date Due    Hepatitis C screen  1965    HIV screen  06/26/1980    Annual Wellness Visit (AWV)  05/29/2019    Colon cancer screen colonoscopy  06/16/2019    A1C test (Diabetic or Prediabetic)  07/14/2021    Lipid screen  07/14/2021    Potassium monitoring  07/14/2021    Creatinine monitoring  07/14/2021    DTaP/Tdap/Td vaccine (2 - Td) 06/06/2027    Flu vaccine  Completed    Shingles Vaccine  Completed    Pneumococcal 0-64 years Vaccine  Completed    Hepatitis A vaccine  Aged Out    Hepatitis B vaccine  Aged Out    Hib vaccine  Aged Out    Meningococcal (ACWY) vaccine  Aged Out     Recommendations for [employer] Due: see orders and patient instructions/AVS.  . Recommended screening schedule for the next 5-10 years is provided to the patient in written form: see Patient Instructions/AVS.    Carlos Rodriguez was seen today for medicare awv. Diagnoses and all orders for this visit:    Routine general medical examination at a health care facility    Seasonal affective disorder [facility])  Discussed mental health and well being  He declines medication or psych referral at this time.

## 2021-02-02 ENCOUNTER — TELEPHONE (OUTPATIENT)
Dept: INTERNAL MEDICINE CLINIC | Age: 56
End: 2021-02-02

## 2021-02-02 NOTE — TELEPHONE ENCOUNTER
Called pt to advise. Lm on vmail to call back with more information as to why a disability placard is needed based on the disability listed. Boubacar Hoffman is not comfortable writing a letter stating this.

## 2021-02-02 NOTE — TELEPHONE ENCOUNTER
Pts. Wife calling. Pt. Is due to renew his handicap placcard this month. I don't see that Dr. Zamora Jaye did his last one so incase you need it the Dx. Is the patient being deaf. Pts. Wife will  when done.

## 2021-02-19 ENCOUNTER — HOSPITAL ENCOUNTER (INPATIENT)
Age: 56
LOS: 1 days | Discharge: HOME | DRG: 247 | End: 2021-02-22
Attending: STUDENT IN AN ORGANIZED HEALTH CARE EDUCATION/TRAINING PROGRAM | Admitting: FAMILY MEDICINE
Payer: MEDICARE

## 2021-02-19 ENCOUNTER — APPOINTMENT (OUTPATIENT)
Dept: GENERAL RADIOLOGY | Age: 56
DRG: 247 | End: 2021-02-19
Payer: MEDICARE

## 2021-02-19 DIAGNOSIS — R07.9 CHEST PAIN, UNSPECIFIED TYPE: Primary | ICD-10-CM

## 2021-02-19 LAB
A/G RATIO: 1.6 (ref 1.1–2.2)
ALBUMIN SERPL-MCNC: 4.7 G/DL (ref 3.4–5)
ALP BLD-CCNC: 79 U/L (ref 40–129)
ALT SERPL-CCNC: 41 U/L (ref 10–40)
ANION GAP SERPL CALCULATED.3IONS-SCNC: 9 MMOL/L (ref 3–16)
APTT: 33.9 SEC (ref 24.2–36.2)
AST SERPL-CCNC: 32 U/L (ref 15–37)
BASOPHILS ABSOLUTE: 0.1 K/UL (ref 0–0.2)
BASOPHILS RELATIVE PERCENT: 0.9 %
BILIRUB SERPL-MCNC: 1.1 MG/DL (ref 0–1)
BUN BLDV-MCNC: 23 MG/DL (ref 7–20)
CALCIUM SERPL-MCNC: 9.3 MG/DL (ref 8.3–10.6)
CHLORIDE BLD-SCNC: 102 MMOL/L (ref 99–110)
CO2: 25 MMOL/L (ref 21–32)
CREAT SERPL-MCNC: 0.9 MG/DL (ref 0.9–1.3)
EKG ATRIAL RATE: 77 BPM
EKG DIAGNOSIS: NORMAL
EKG P AXIS: 36 DEGREES
EKG P-R INTERVAL: 116 MS
EKG Q-T INTERVAL: 368 MS
EKG QRS DURATION: 92 MS
EKG QTC CALCULATION (BAZETT): 416 MS
EKG R AXIS: -60 DEGREES
EKG T AXIS: 75 DEGREES
EKG VENTRICULAR RATE: 77 BPM
EOSINOPHILS ABSOLUTE: 0.6 K/UL (ref 0–0.6)
EOSINOPHILS RELATIVE PERCENT: 4.8 %
GFR AFRICAN AMERICAN: >60
GFR NON-AFRICAN AMERICAN: >60
GLOBULIN: 3 G/DL
GLUCOSE BLD-MCNC: 103 MG/DL (ref 70–99)
GLUCOSE BLD-MCNC: 107 MG/DL (ref 70–99)
GLUCOSE BLD-MCNC: 98 MG/DL (ref 70–99)
HCT VFR BLD CALC: 45.7 % (ref 40.5–52.5)
HEMOGLOBIN: 15.3 G/DL (ref 13.5–17.5)
INR BLD: 1.06 (ref 0.86–1.14)
LYMPHOCYTES ABSOLUTE: 3.7 K/UL (ref 1–5.1)
LYMPHOCYTES RELATIVE PERCENT: 31.2 %
MCH RBC QN AUTO: 31.1 PG (ref 26–34)
MCHC RBC AUTO-ENTMCNC: 33.5 G/DL (ref 31–36)
MCV RBC AUTO: 92.6 FL (ref 80–100)
MONOCYTES ABSOLUTE: 0.9 K/UL (ref 0–1.3)
MONOCYTES RELATIVE PERCENT: 7.5 %
NEUTROPHILS ABSOLUTE: 6.6 K/UL (ref 1.7–7.7)
NEUTROPHILS RELATIVE PERCENT: 55.6 %
PDW BLD-RTO: 13 % (ref 12.4–15.4)
PERFORMED ON: ABNORMAL
PERFORMED ON: NORMAL
PLATELET # BLD: 261 K/UL (ref 135–450)
PMV BLD AUTO: 9 FL (ref 5–10.5)
POTASSIUM SERPL-SCNC: 4.5 MMOL/L (ref 3.5–5.1)
PRO-BNP: 26 PG/ML (ref 0–124)
PROTHROMBIN TIME: 12.3 SEC (ref 10–13.2)
RBC # BLD: 4.93 M/UL (ref 4.2–5.9)
SARS-COV-2, NAAT: NOT DETECTED
SODIUM BLD-SCNC: 136 MMOL/L (ref 136–145)
TOTAL PROTEIN: 7.7 G/DL (ref 6.4–8.2)
TROPONIN: <0.01 NG/ML
WBC # BLD: 11.9 K/UL (ref 4–11)

## 2021-02-19 PROCEDURE — 83036 HEMOGLOBIN GLYCOSYLATED A1C: CPT

## 2021-02-19 PROCEDURE — 87635 SARS-COV-2 COVID-19 AMP PRB: CPT

## 2021-02-19 PROCEDURE — 36415 COLL VENOUS BLD VENIPUNCTURE: CPT

## 2021-02-19 PROCEDURE — 85730 THROMBOPLASTIN TIME PARTIAL: CPT

## 2021-02-19 PROCEDURE — 84484 ASSAY OF TROPONIN QUANT: CPT

## 2021-02-19 PROCEDURE — G0378 HOSPITAL OBSERVATION PER HR: HCPCS

## 2021-02-19 PROCEDURE — 93010 ELECTROCARDIOGRAM REPORT: CPT | Performed by: INTERNAL MEDICINE

## 2021-02-19 PROCEDURE — 93005 ELECTROCARDIOGRAM TRACING: CPT | Performed by: STUDENT IN AN ORGANIZED HEALTH CARE EDUCATION/TRAINING PROGRAM

## 2021-02-19 PROCEDURE — 85025 COMPLETE CBC W/AUTO DIFF WBC: CPT

## 2021-02-19 PROCEDURE — 83880 ASSAY OF NATRIURETIC PEPTIDE: CPT

## 2021-02-19 PROCEDURE — 80053 COMPREHEN METABOLIC PANEL: CPT

## 2021-02-19 PROCEDURE — 6370000000 HC RX 637 (ALT 250 FOR IP): Performed by: PHYSICIAN ASSISTANT

## 2021-02-19 PROCEDURE — 99283 EMERGENCY DEPT VISIT LOW MDM: CPT

## 2021-02-19 PROCEDURE — 85610 PROTHROMBIN TIME: CPT

## 2021-02-19 PROCEDURE — 71046 X-RAY EXAM CHEST 2 VIEWS: CPT

## 2021-02-19 PROCEDURE — 2580000003 HC RX 258: Performed by: FAMILY MEDICINE

## 2021-02-19 PROCEDURE — 6370000000 HC RX 637 (ALT 250 FOR IP): Performed by: FAMILY MEDICINE

## 2021-02-19 RX ORDER — DEXTROSE MONOHYDRATE 50 MG/ML
100 INJECTION, SOLUTION INTRAVENOUS PRN
Status: DISCONTINUED | OUTPATIENT
Start: 2021-02-19 | End: 2021-02-22 | Stop reason: HOSPADM

## 2021-02-19 RX ORDER — ATORVASTATIN CALCIUM 20 MG/1
20 TABLET, FILM COATED ORAL DAILY
Status: DISCONTINUED | OUTPATIENT
Start: 2021-02-19 | End: 2021-02-22

## 2021-02-19 RX ORDER — DEXTROSE MONOHYDRATE 25 G/50ML
12.5 INJECTION, SOLUTION INTRAVENOUS PRN
Status: DISCONTINUED | OUTPATIENT
Start: 2021-02-19 | End: 2021-02-22 | Stop reason: HOSPADM

## 2021-02-19 RX ORDER — INSULIN LISPRO 100 [IU]/ML
0-6 INJECTION, SOLUTION INTRAVENOUS; SUBCUTANEOUS NIGHTLY
Status: DISCONTINUED | OUTPATIENT
Start: 2021-02-19 | End: 2021-02-22 | Stop reason: HOSPADM

## 2021-02-19 RX ORDER — SODIUM CHLORIDE 0.9 % (FLUSH) 0.9 %
10 SYRINGE (ML) INJECTION PRN
Status: DISCONTINUED | OUTPATIENT
Start: 2021-02-19 | End: 2021-02-22 | Stop reason: HOSPADM

## 2021-02-19 RX ORDER — ACETAMINOPHEN 650 MG/1
650 SUPPOSITORY RECTAL EVERY 6 HOURS PRN
Status: DISCONTINUED | OUTPATIENT
Start: 2021-02-19 | End: 2021-02-22 | Stop reason: HOSPADM

## 2021-02-19 RX ORDER — ASPIRIN 81 MG/1
324 TABLET, CHEWABLE ORAL ONCE
Status: COMPLETED | OUTPATIENT
Start: 2021-02-19 | End: 2021-02-19

## 2021-02-19 RX ORDER — LISINOPRIL 10 MG/1
20 TABLET ORAL DAILY
Status: DISCONTINUED | OUTPATIENT
Start: 2021-02-19 | End: 2021-02-22 | Stop reason: HOSPADM

## 2021-02-19 RX ORDER — INSULIN LISPRO 100 [IU]/ML
0-12 INJECTION, SOLUTION INTRAVENOUS; SUBCUTANEOUS
Status: DISCONTINUED | OUTPATIENT
Start: 2021-02-19 | End: 2021-02-22 | Stop reason: HOSPADM

## 2021-02-19 RX ORDER — POLYETHYLENE GLYCOL 3350 17 G/17G
17 POWDER, FOR SOLUTION ORAL DAILY PRN
Status: DISCONTINUED | OUTPATIENT
Start: 2021-02-19 | End: 2021-02-22 | Stop reason: HOSPADM

## 2021-02-19 RX ORDER — SODIUM CHLORIDE 0.9 % (FLUSH) 0.9 %
10 SYRINGE (ML) INJECTION EVERY 12 HOURS SCHEDULED
Status: DISCONTINUED | OUTPATIENT
Start: 2021-02-19 | End: 2021-02-22 | Stop reason: HOSPADM

## 2021-02-19 RX ORDER — ONDANSETRON 2 MG/ML
4 INJECTION INTRAMUSCULAR; INTRAVENOUS EVERY 6 HOURS PRN
Status: DISCONTINUED | OUTPATIENT
Start: 2021-02-19 | End: 2021-02-22 | Stop reason: HOSPADM

## 2021-02-19 RX ORDER — PROMETHAZINE HYDROCHLORIDE 25 MG/1
12.5 TABLET ORAL EVERY 6 HOURS PRN
Status: DISCONTINUED | OUTPATIENT
Start: 2021-02-19 | End: 2021-02-22 | Stop reason: HOSPADM

## 2021-02-19 RX ORDER — NICOTINE POLACRILEX 4 MG
15 LOZENGE BUCCAL PRN
Status: DISCONTINUED | OUTPATIENT
Start: 2021-02-19 | End: 2021-02-22 | Stop reason: HOSPADM

## 2021-02-19 RX ORDER — ASPIRIN 81 MG/1
81 TABLET ORAL DAILY
Status: DISCONTINUED | OUTPATIENT
Start: 2021-02-19 | End: 2021-02-22 | Stop reason: HOSPADM

## 2021-02-19 RX ORDER — ACETAMINOPHEN 325 MG/1
650 TABLET ORAL EVERY 6 HOURS PRN
Status: DISCONTINUED | OUTPATIENT
Start: 2021-02-19 | End: 2021-02-22 | Stop reason: DRUGHIGH

## 2021-02-19 RX ADMIN — Medication 10 ML: at 21:21

## 2021-02-19 RX ADMIN — ASPIRIN 324 MG: 81 TABLET, CHEWABLE ORAL at 13:48

## 2021-02-19 RX ADMIN — ATORVASTATIN CALCIUM 20 MG: 20 TABLET, FILM COATED ORAL at 16:27

## 2021-02-19 RX ADMIN — ASPIRIN 81 MG: 81 TABLET, FILM COATED ORAL at 16:27

## 2021-02-19 RX ADMIN — LISINOPRIL 20 MG: 10 TABLET ORAL at 16:27

## 2021-02-19 ASSESSMENT — ENCOUNTER SYMPTOMS
RHINORRHEA: 0
NAUSEA: 0
ABDOMINAL PAIN: 0
DIARRHEA: 0
VOMITING: 0
SHORTNESS OF BREATH: 1
COUGH: 0

## 2021-02-19 ASSESSMENT — PAIN SCALES - GENERAL: PAINLEVEL_OUTOF10: 0

## 2021-02-19 NOTE — ED NOTES
Bed: 13  Expected date:   Expected time:   Means of arrival:   Comments:  [address], [address] [facility]  02/19/21 1315

## 2021-02-19 NOTE — ED NOTES
Nursing report called to [facility], RN on receiving unit. RN accepts patient and has no further questions. Used zoom for bedside report.      Herb Sanchez RN  02/19/21 0634

## 2021-02-19 NOTE — ED PROVIDER NOTES
I independently performed a history and physical on [address] [place] [address]. All diagnostic, treatment, and disposition decisions were made by myself in conjunction with the advanced practice provider. Briefly, this is a 54 y.o. male here for chest pain with exertion over the last week. Pain is substernal when it comes on. With rest it goes away. No radiation of pain to the back. He smokes, has HTN, DM, and family hx of CAD. On exam pt is resting comfortably  Cardiac RRR, no murmur  Lungs clear bilaterally, no increased work of breathing  No calf edema or tenderness        EKG  The Ekg interpreted by me in the absence of a cardiologist shows. normal sinus rhythm with a rate of 77  Axis is   Normal  QTc is  normal  Intervals and Durations are unremarkable. No specific ST-T wave changes appreciated. No evidence of acute ischemia. No significant change from prior EKG dated 11/11/2013      XR CHEST (2 VW)   Final Result   No acute cardiopulmonary process.            Labs Reviewed   CBC WITH AUTO DIFFERENTIAL - Abnormal; Notable for the following components:       Result Value    WBC 11.9 (*)     All other components within normal limits    Narrative:     Performed at:  [facility] [address], [place], [address]   Phone [phone]   COMPREHENSIVE METABOLIC PANEL - Abnormal; Notable for the following components:    Glucose 107 (*)     BUN 23 (*)     Total Bilirubin 1.1 (*)     ALT 41 (*)     All other components within normal limits    Narrative:     Performed at:  [facility] [address], [place], [facility] [address]   Phone [phone]   TROPONIN    Narrative:     Performed at:  [facility] [address], [place], [facility] [address]   Phone [phone]   PROTIME-INR    Narrative:     Performed at:  [facility] [address], [place], [facility] [address]   Phone [phone] BRAIN NATRIURETIC PEPTIDE    Narrative:     Performed at:  [facility]  [address], [address]   Phone [phone]   APTT    Narrative:     Performed at:  [facility]  [address], [address]   Phone [phone]     Medications   aspirin chewable tablet 324 mg (324 mg Oral Given 2/19/21 1348)             Screenings           Heart Score for chest pain patients  History: Highly Suspicious  ECG: Normal  Patient Age: > 39 and < 65 years  *Risk factors for Atherosclerotic disease: Hypertension, Obesity, Hypercholesterolemia, Diabetes Mellitus  Risk Factors: > 3 Risk factors or history of atherosclerotic disease*  Troponin: < 1X normal limit  Heart Score Total: 5     Medications   aspirin chewable tablet 324 mg (324 mg Oral Given 2/19/21 1348)       Course and MDM:  Patient is 78-year-old male with cardiac risk factors presenting for exertional chest pain increasing in frequency. Improves with rest.  On my exam he is hemodynamically stable and chest pain-free. No radiation of pain to the back. He has symmetric pulses, no cardiomediastinal widening on chest x-ray and suspicion for dissection is low. Initial EKG and troponin are not suggestive of acute ischemia. Heart score is 5 today. 325 aspirin given. Will request admission for ACS rule out, stress test and further cardiac input. He is agreeable to admission for the above. Patient Referrals:  No follow-up provider specified. Discharge Medications:  Current Discharge Medication List          FINAL IMPRESSION  1. Chest pain, unspecified type        Blood pressure 119/64, pulse 74, temperature 99.1 °F (37.3 °C), temperature source Oral, resp. rate 18, height 5' 10\" (1.778 m), weight 249 lb 5 oz (113.1 kg), SpO2 94 %.      For further details of Yvette Margot Britton's emergency department encounter, please see documentation by advanced practice provider Yessica Loera MD  02/19/21 8079

## 2021-02-19 NOTE — CARE COORDINATION
Patient admitted as Observation with an anticipated short hospitalization length of stay. Chart reviewed and it appears that patient has minimal needs for discharge at this time. Discussed with patients nurse and requested that case management be notified if discharge needs are identified. *Case management will continue to follow progress and update discharge plan as needed.     Electronically signed by ESTEBAN Dougherty, ABBY on 2/19/2021 at 3:16 PM

## 2021-02-19 NOTE — ED PROVIDER NOTES
[facility]        Pt Name: Rose Mary Montiel  MRN: [id number]  [place] 1965  Date of evaluation: 2/19/2021  Provider: Rufino Randhawa PA-C  PCP: Anam Baldwin MD     I have seen and evaluated this patient with my supervising physician Dinesh Wen MD.    [address]       Chief Complaint   Patient presents with    Shortness of Breath     pt with increases sob and CP on and off, worse with exertion, no known covid contacts but concerned. HISTORY OF PRESENT ILLNESS   (Location, Timing/Onset, Context/Setting, Quality, Duration, Modifying Factors, Severity, Associated Signs and Symptoms)  Note limiting factors. Rose Mary Montiel is a 54 y.o. male who presents to the emergency department today for evaluation for chest pain and shortness of breath. The patient has a history of hypertension, diabetes and hyperlipidemia. He smokes approximately a pack of cigarettes per day. The patient sister actually just had an MI, had had several stents placed. The patient states that over the past 2 to 3 days he has been noticing intermittent left-sided and substernal chest heaviness, with associated shortness of breath. The patient states that he first noticed the symptoms when he was shoveling snow, and he states that he would sit down, rest and they would resolve. He states that each day when he is doing small levels of activity, his symptoms return. He states that today he was just carrying cases of pop and from the grocery store and had symptoms. Patient states that the resolved after 15 minutes or so. The patient states that he is asymptomatic at this time. The patient has not had a stress test within the past 5 years. The patient denies any fever or chills. No cough or congestion. He has no abdominal pain, nausea, vomiting or diarrhea. No urinary symptoms. No lower leg pain or swelling. No history of DVT or PE. He denies having pain radiate towards his back    Nursing Notes were all reviewed and agreed with or any disagreements were addressed in the HPI. REVIEW OF SYSTEMS    (2-9 systems for level 4, 10 or more for level 5)     Review of Systems   Constitutional: Negative for activity change, appetite change, chills and fever. HENT: Negative for congestion and rhinorrhea. Respiratory: Positive for shortness of breath. Negative for cough. Cardiovascular: Positive for chest pain. Gastrointestinal: Negative for abdominal pain, diarrhea, nausea and vomiting. Genitourinary: Negative for difficulty urinating, dysuria and hematuria. Positives and Pertinent negatives as per HPI. Except as noted above in the ROS, all other systems were reviewed and negative.        PAST MEDICAL HISTORY     Past Medical History:   Diagnosis Date    Back pain     Congenital deafness     Deaf     Hypercholesteremia     Hyperglycemia     ADA (obstructive sleep apnea)     PONV (postoperative nausea and vomiting)     Prolonged emergence from general anesthesia          SURGICAL HISTORY     Past Surgical History:   Procedure Laterality Date    HERNIA REPAIR  1994    x2    INGUINAL HERNIA REPAIR  7/1/15    mff    KIDNEY STONE SURGERY           CURRENTMEDICATIONS       Previous Medications    ASPIRIN EC 81 MG EC TABLET    Take 1 tablet by mouth daily    ATORVASTATIN (LIPITOR) 20 MG TABLET    TAKE ONE TABLET BY MOUTH DAILY    DOCUSATE SODIUM (COLACE) 100 MG CAPSULE    Take 100 mg by mouth daily     LISINOPRIL (PRINIVIL;ZESTRIL) 10 MG TABLET    TAKE ONE TABLET BY MOUTH DAILY    METFORMIN (GLUCOPHAGE) 500 MG TABLET    TAKE ONE TABLET BY MOUTH DAILY WITH BREAKFAST    POLYCARBOPHIL (FIBERCON) 625 MG TABLET    Take 625 mg by mouth daily         ALLERGIES     Tylenol [acetaminophen]    FAMILYHISTORY       Family History   Problem Relation Age of Onset    Cancer Other         lung    Diabetes Mother     Kidney Disease Father         41 y/o  of kidney disease          SOCIAL HISTORY       Social History     Tobacco Use    Smoking status: Current Every Day Smoker     Packs/day: 1.00     Types: Cigarettes    Smokeless tobacco: Never Used   Substance Use Topics    Alcohol use: No     Alcohol/week: 0.0 standard drinks    Drug use: No       SCREENINGS             PHYSICAL EXAM    (up to 7 for level 4, 8 or more for level 5)     ED Triage Vitals   BP Temp Temp Source Pulse Resp SpO2 Height Weight   21 1209 21 1209 21 1209 21 1209 21 1209 21 1209 21 1209 21 1203 119/64 99.1 °F (37.3 °C) Oral 74 18 94 % 5' 10\" (1.778 m) 249 lb 5 oz (113.1 kg)       Physical Exam  Vitals signs and nursing note reviewed. Constitutional:       Appearance: He is well-developed. He is not diaphoretic. HENT:      Head: Normocephalic and atraumatic. Right Ear: External ear normal.      Left Ear: External ear normal.      Nose: Nose normal.   Eyes:      General:         Right eye: No discharge. Left eye: No discharge. Neck:      Musculoskeletal: Normal range of motion and neck supple. Trachea: No tracheal deviation. Cardiovascular:      Rate and Rhythm: Normal rate and regular rhythm. Heart sounds: No murmur. Pulmonary:      Effort: Pulmonary effort is normal. No respiratory distress. Breath sounds: Normal breath sounds. No wheezing or rales. Abdominal:      General: Bowel sounds are normal. There is no distension. Palpations: Abdomen is soft. Tenderness: There is no abdominal tenderness. There is no guarding. Musculoskeletal: Normal range of motion. Skin:     General: Skin is warm and dry. Neurological:      Mental Status: He is alert and oriented to person, place, and time.    Psychiatric:         Behavior: Behavior normal.         DIAGNOSTIC RESULTS   LABS:    Labs Reviewed   CBC WITH AUTO DIFFERENTIAL - Abnormal; Notable for the following components:       Result Value    WBC 11.9 (*)     All other components within normal limits    Narrative:     Performed at:  [facility]  [address] [facility],  [address], [address]   Phone [phone]   COMPREHENSIVE METABOLIC PANEL - Abnormal; Notable for the following components:    Glucose 107 (*)     BUN 23 (*)     Total Bilirubin 1.1 (*)     ALT 41 (*)     All other components within normal limits    Narrative:     Performed at:  [facility]  [facility],  [address], [address]   Phone [phone]   TROPONIN    Narrative: Performed at:  [facility]  [address] [facility], [address]   Phone [phone]   PROTIME-INR    Narrative:     Performed at:  [facility]  [address] [facility], [address]   Phone [phone]   BRAIN NATRIURETIC PEPTIDE    Narrative:     Performed at:  [facility]  [address] [facility], [address]   Phone [phone]   APTT    Narrative:     Performed at:  [facility]  [address] [facility], [address]   Phone [phone]       All other labs were within normal range or not returned as of this dictation. EKG: All EKG's are interpreted by the Emergency Department Physician in the absence of a cardiologist.  Please see their note for interpretation of EKG. RADIOLOGY:   Non-plain film images such as CT, Ultrasound and MRI are read by the radiologist. Plain radiographic images are visualized and preliminarily interpreted by the ED Provider with the below findings:        Interpretation per the Radiologist below, if available at the time of this note:    XR CHEST (2 VW)   Preliminary Result   No acute cardiopulmonary process. Xr Chest (2 Vw)    Result Date: 2/19/2021  EXAMINATION: TWO XRAY VIEWS OF THE CHEST 2/19/2021 1:40 pm COMPARISON: June 4, 2015 HISTORY: ORDERING SYSTEM PROVIDED HISTORY: Chest Discomfort TECHNOLOGIST PROVIDED HISTORY: Reason for exam:->Chest Discomfort Initial evaluation, chest pain FINDINGS: The cardiomediastinal silhouette is normal in size. The lungs are clear. No pleural effusion or pneumothorax is present. No acute cardiopulmonary process.            PROCEDURES   Unless otherwise noted below, none     Procedures    CRITICAL CARE TIME   N/A    CONSULTS:  None      EMERGENCY DEPARTMENT COURSE and DIFFERENTIAL DIAGNOSIS/MDM:   Vitals:    Vitals:    02/19/21 1203 02/19/21 1209   BP:  119/64   Pulse:  74   Resp:  18 Temp:  99.1 °F (37.3 °C)   TempSrc:  Oral   SpO2:  94%   Weight: 249 lb 5 oz (113.1 kg)    Height:  5' 10\" (1.778 m)       Patient was given the following medications:  Medications   aspirin chewable tablet 324 mg (324 mg Oral Given 2/19/21 1345)           Brief, this is a 59-year-old male who presents emergency department today for evaluation for chest pain and shortness of breath. The patient states that he has been experiencing the symptoms intermittently, but only with exertion for the past several days the patient has a history of hypertension, diabetes, hyperlipidemia. He is a smoker with positive family history. No recent cardiac work-up    Patient is asymptomatic at this time. His EKG is documented by my attending, please see her note for further details    Lab work in the ED is unremarkable including a negative troponin. However the patient has a heart score of 5, with no recent cardiac work-up he will need to be admitted for further care and evaluation. Aspirin given. The patient is updated and agreeable with plan. Stable for admission. Hospitalist has been paged for admission    FINAL IMPRESSION      1. Chest pain, unspecified type          DISPOSITION/PLAN   DISPOSITION Decision To Admit 02/19/2021 02:46:46 PM      PATIENT REFERREDTO:  No follow-up provider specified.     DISCHARGE MEDICATIONS:  New Prescriptions    No medications on file       DISCONTINUED MEDICATIONS:  Discontinued Medications    No medications on file              (Please note that portions of this note were completed with a voice recognition program.  Efforts were made to edit the dictations but occasionally words are mis-transcribed.)    Landon Pearson PA-C (electronically signed)            Landon Pearson PA-C  02/19/21 [id number]

## 2021-02-19 NOTE — PROGRESS NOTES
Pt admitted from ER to room 3322. Pt is A/OX4. Pt on telemetry. Call light with in reach. Pt denies of CP on admission. Denies any needs at this time.

## 2021-02-20 PROBLEM — I20.0 UNSTABLE ANGINA (HCC): Status: ACTIVE | Noted: 2021-02-20

## 2021-02-20 LAB
APTT: 36 SEC (ref 24.2–36.2)
APTT: 54.7 SEC (ref 24.2–36.2)
ESTIMATED AVERAGE GLUCOSE: 134.1 MG/DL
GLUCOSE BLD-MCNC: 118 MG/DL (ref 70–99)
GLUCOSE BLD-MCNC: 120 MG/DL (ref 70–99)
GLUCOSE BLD-MCNC: 148 MG/DL (ref 70–99)
GLUCOSE BLD-MCNC: 90 MG/DL (ref 70–99)
HBA1C MFR BLD: 6.3 %
HCT VFR BLD CALC: 45 % (ref 40.5–52.5)
HEMOGLOBIN: 15.1 G/DL (ref 13.5–17.5)
LV EF: 60 %
LV EF: 60 %
LVEF MODALITY: NORMAL
LVEF MODALITY: NORMAL
MCH RBC QN AUTO: 31.5 PG (ref 26–34)
MCHC RBC AUTO-ENTMCNC: 33.6 G/DL (ref 31–36)
MCV RBC AUTO: 94 FL (ref 80–100)
PDW BLD-RTO: 12.8 % (ref 12.4–15.4)
PERFORMED ON: ABNORMAL
PERFORMED ON: NORMAL
PLATELET # BLD: 271 K/UL (ref 135–450)
PMV BLD AUTO: 8.9 FL (ref 5–10.5)
RBC # BLD: 4.79 M/UL (ref 4.2–5.9)
WBC # BLD: 13.1 K/UL (ref 4–11)

## 2021-02-20 PROCEDURE — 96365 THER/PROPH/DIAG IV INF INIT: CPT

## 2021-02-20 PROCEDURE — G0378 HOSPITAL OBSERVATION PER HR: HCPCS

## 2021-02-20 PROCEDURE — 6360000002 HC RX W HCPCS: Performed by: INTERNAL MEDICINE

## 2021-02-20 PROCEDURE — 6370000000 HC RX 637 (ALT 250 FOR IP): Performed by: INTERNAL MEDICINE

## 2021-02-20 PROCEDURE — 78452 HT MUSCLE IMAGE SPECT MULT: CPT | Performed by: INTERNAL MEDICINE

## 2021-02-20 PROCEDURE — 3430000000 HC RX DIAGNOSTIC RADIOPHARMACEUTICAL: Performed by: INTERNAL MEDICINE

## 2021-02-20 PROCEDURE — 85027 COMPLETE CBC AUTOMATED: CPT

## 2021-02-20 PROCEDURE — A9502 TC99M TETROFOSMIN: HCPCS | Performed by: INTERNAL MEDICINE

## 2021-02-20 PROCEDURE — 93306 TTE W/DOPPLER COMPLETE: CPT

## 2021-02-20 PROCEDURE — 6370000000 HC RX 637 (ALT 250 FOR IP): Performed by: FAMILY MEDICINE

## 2021-02-20 PROCEDURE — 85730 THROMBOPLASTIN TIME PARTIAL: CPT

## 2021-02-20 PROCEDURE — 36415 COLL VENOUS BLD VENIPUNCTURE: CPT

## 2021-02-20 PROCEDURE — 2580000003 HC RX 258: Performed by: FAMILY MEDICINE

## 2021-02-20 PROCEDURE — 93017 CV STRESS TEST TRACING ONLY: CPT | Performed by: INTERNAL MEDICINE

## 2021-02-20 PROCEDURE — 99223 1ST HOSP IP/OBS HIGH 75: CPT | Performed by: INTERNAL MEDICINE

## 2021-02-20 RX ORDER — HEPARIN SODIUM 1000 [USP'U]/ML
2000 INJECTION, SOLUTION INTRAVENOUS; SUBCUTANEOUS PRN
Status: DISCONTINUED | OUTPATIENT
Start: 2021-02-20 | End: 2021-02-22

## 2021-02-20 RX ORDER — HEPARIN SODIUM 1000 [USP'U]/ML
4000 INJECTION, SOLUTION INTRAVENOUS; SUBCUTANEOUS ONCE
Status: COMPLETED | OUTPATIENT
Start: 2021-02-20 | End: 2021-02-20

## 2021-02-20 RX ORDER — HEPARIN SODIUM 1000 [USP'U]/ML
30 INJECTION, SOLUTION INTRAVENOUS; SUBCUTANEOUS PRN
Status: DISCONTINUED | OUTPATIENT
Start: 2021-02-20 | End: 2021-02-20

## 2021-02-20 RX ORDER — HEPARIN SODIUM 1000 [USP'U]/ML
4000 INJECTION, SOLUTION INTRAVENOUS; SUBCUTANEOUS PRN
Status: DISCONTINUED | OUTPATIENT
Start: 2021-02-20 | End: 2021-02-22

## 2021-02-20 RX ORDER — HEPARIN SODIUM 1000 [USP'U]/ML
60 INJECTION, SOLUTION INTRAVENOUS; SUBCUTANEOUS PRN
Status: DISCONTINUED | OUTPATIENT
Start: 2021-02-20 | End: 2021-02-20

## 2021-02-20 RX ORDER — HEPARIN SODIUM 10000 [USP'U]/100ML
8.8 INJECTION, SOLUTION INTRAVENOUS CONTINUOUS
Status: DISCONTINUED | OUTPATIENT
Start: 2021-02-20 | End: 2021-02-22

## 2021-02-20 RX ADMIN — TETROFOSMIN 30 MILLICURIE: 1.38 INJECTION, POWDER, LYOPHILIZED, FOR SOLUTION INTRAVENOUS at 08:58

## 2021-02-20 RX ADMIN — Medication 10 ML: at 20:43

## 2021-02-20 RX ADMIN — TETROFOSMIN 10 MILLICURIE: 1.38 INJECTION, POWDER, LYOPHILIZED, FOR SOLUTION INTRAVENOUS at 07:43

## 2021-02-20 RX ADMIN — REGADENOSON 0.4 MG: 0.08 INJECTION, SOLUTION INTRAVENOUS at 08:49

## 2021-02-20 RX ADMIN — HEPARIN SODIUM 4000 UNITS: 1000 INJECTION INTRAVENOUS; SUBCUTANEOUS at 17:49

## 2021-02-20 RX ADMIN — Medication 10 ML: at 11:10

## 2021-02-20 RX ADMIN — ATORVASTATIN CALCIUM 20 MG: 20 TABLET, FILM COATED ORAL at 10:54

## 2021-02-20 RX ADMIN — HEPARIN SODIUM 8.8 UNITS/KG/HR: 10000 INJECTION, SOLUTION INTRAVENOUS at 17:49

## 2021-02-20 RX ADMIN — ASPIRIN 81 MG: 81 TABLET, FILM COATED ORAL at 10:54

## 2021-02-20 RX ADMIN — LISINOPRIL 20 MG: 10 TABLET ORAL at 10:54

## 2021-02-20 RX ADMIN — METOPROLOL TARTRATE 25 MG: 25 TABLET, FILM COATED ORAL at 20:42

## 2021-02-20 ASSESSMENT — PAIN SCALES - GENERAL
PAINLEVEL_OUTOF10: 0

## 2021-02-20 NOTE — PROGRESS NOTES
Pt unable to complete gxt treadmill test due to fatigue. Pt switched to walking lexiscan test.     Instructed on Lexiscan Stress Test Procedure including possible side effects/ adverse reactions. Patient verbalizes  understanding and denies having any questions . See [address] [place] [address] Cardiology

## 2021-02-20 NOTE — H&P
HOSPITALISTS HISTORY AND PHYSICAL    2/19/2021 7:56 PM    Patient Information:  Flakito Zhou is a 54 y.o. male [id number]  PCP:  Destiny Irby MD (Tel: [phone] )    Chief complaint:    Chief Complaint   Patient presents with    Shortness of Breath     pt with increases sob and CP on and off, worse with exertion, no known covid contacts but concerned. History of Present Illness:  Dominique Wolff is a 54 y.o. male with h/o HTN , DM, Koi presents with c/o dyspnea and fatigue with exertion. He states he shovelled a lot of snow today and got out of breath. He was  Concerned about COVID and wanted to get it checked. No known h/o CAD , no recent stress test. He was given ASA in the EDThe pt denies any chest pain at this time. EKG showed no st/ T changes. tropopnin is < 0.01. Chest xray is neg for Pneumonia. REVIEW OF SYSTEMS:   Constitutional: Negative for fever,chills or night sweats  ENT: Negative for rhinorrhea, epistaxis, hoarseness, sore throat. Respiratory: Negative for shortness of breath,wheezing  Cardiovascular: Negative for chest pain, palpitations   Gastrointestinal: Negative for nausea, vomiting, diarrhea  Genitourinary: Negative for polyuria, dysuria   Hematologic/Lymphatic: Negative for bleeding tendency, easy bruising  Musculoskeletal: Negative for myalgias and arthralgias  Neurologic: Negative for confusion,dysarthria. Skin: Negative for itching,rash  Psychiatric: Negative for depression,anxiety, agitation. Endocrine: Negative for polydipsia,polyuria,heat /cold intolerance. Past Medical History:   has a past medical history of Back pain, Congenital deafness, Deaf, Hypercholesteremia, Hyperglycemia, ADA (obstructive sleep apnea), PONV (postoperative nausea and vomiting), and Prolonged emergence from general anesthesia.      Past Surgical History: has a past surgical history that includes hernia repair (1994); Kidney stone surgery; and Inguinal hernia repair (7/1/15). Medications:  No current facility-administered medications on file prior to encounter.       Current Outpatient Medications on File Prior to Encounter   Medication Sig Dispense Refill    atorvastatin (LIPITOR) 20 MG tablet TAKE ONE TABLET BY MOUTH DAILY 90 tablet 1    lisinopril (PRINIVIL;ZESTRIL) 10 MG tablet TAKE ONE TABLET BY MOUTH DAILY 90 tablet 1    metFORMIN (GLUCOPHAGE) 500 MG tablet TAKE ONE TABLET BY MOUTH DAILY WITH BREAKFAST 90 tablet 1    docusate sodium (COLACE) 100 MG capsule Take 100 mg by mouth daily       aspirin EC 81 MG EC tablet Take 1 tablet by mouth daily 30 tablet 0     Current Facility-Administered Medications   Medication Dose Route Frequency Provider Last Rate Last Admin    aspirin EC tablet 81 mg  81 mg Oral Daily Marilyn Villalobos MD   81 mg at 02/19/21 1627    atorvastatin (LIPITOR) tablet 20 mg  20 mg Oral Daily Marilyn Villalobos MD   20 mg at 02/19/21 1627    lisinopril (PRINIVIL;ZESTRIL) tablet 20 mg  20 mg Oral Daily Marilyn Villalobos MD   20 mg at 02/19/21 1627    glucose (GLUTOSE) 40 % oral gel 15 g  15 g Oral PRN Arlinda Frankel, MD        dextrose 50 % IV solution  12.5 g Intravenous PRN Arlinda Frankel, MD        glucagon (rDNA) injection 1 mg  1 mg Intramuscular PRN Arlinda Frankel, MD        dextrose 5 % solution  100 mL/hr Intravenous PRN Arlinda Frankel, MD        sodium chloride flush 0.9 % injection 10 mL  10 mL Intravenous 2 times per day Arlinda Frankel, MD        sodium chloride flush 0.9 % injection 10 mL  10 mL Intravenous PRN Arlinda Frankel, MD        enoxaparin (LOVENOX) injection 40 mg  40 mg Subcutaneous Daily Arlinda Frankel, MD        promethazine (PHENERGAN) tablet 12.5 mg  12.5 mg Oral Q6H PRN Arlinda Frankel, MD        Or    ondansetron (ZOFRAN) injection 4 mg  4 mg Intravenous Q6H PRN Arlinda Frankel, MD

## 2021-02-20 NOTE — PLAN OF CARE
Problem: Falls - Risk of:  Goal: Will remain free from falls  Description: Will remain free from falls  Outcome: Met This Shift  Goal: Absence of physical injury  Description: Absence of physical injury  Outcome: Met This Shift     Problem: Infection:  Goal: Will remain free from infection  Description: Will remain free from infection  Outcome: Met This Shift     Problem: Safety:  Goal: Free from accidental physical injury  Description: Free from accidental physical injury  Outcome: Met This Shift  Goal: Free from intentional harm  Description: Free from intentional harm  Outcome: Met This Shift     Problem: Daily Care:  Goal: Daily care needs are met  Description: Daily care needs are met  Outcome: Met This Shift     Problem: Pain:  Goal: Patient's pain/discomfort is manageable  Description: Patient's pain/discomfort is manageable  Outcome: Met This Shift     Problem: Skin Integrity:  Goal: Skin integrity will stabilize  Description: Skin integrity will stabilize  Outcome: Met This Shift     Problem: Discharge Planning:  Goal: Patients continuum of care needs are met  Description: Patients continuum of care needs are met  Outcome: Ongoing     Problem: Cardiac:  Goal: Ability to maintain an adequate cardiac output will improve  Description: Ability to maintain an adequate cardiac output will improve  Outcome: Ongoing  Goal: Complications related to the disease process, condition or treatment will be avoided or minimized  Description: Complications related to the disease process, condition or treatment will be avoided or minimized  Outcome: Ongoing  Goal: Hemodynamic stability will improve  Description: Hemodynamic stability will improve  Outcome: Ongoing  Goal: Risk factors for ineffective tissue perfusion will decrease  Description: Risk factors for ineffective tissue perfusion will decrease  Outcome: Ongoing     Problem: Fluid Volume:  Goal: Will show no signs or symptoms of fluid imbalance Description: Will show no signs or symptoms of fluid imbalance  Outcome: Ongoing

## 2021-02-20 NOTE — PROGRESS NOTES
[facility] PROGRESS NOTE    2/20/2021 12:33 PM        Name: Mitch Lindsey . Admitted: 2/19/2021  Primary Care Provider: Hazel Gaitan MD (Tel: [phone])      Subjective:  . Admitted with chest pain. He was shoveling snow a few days ago. He had associated fatigue. Denies chest pain or shortness of breath today. Wife is at bedside. Patient is legally deaf but is able to read lips.     Reviewed interval ancillary notes    Current Medications      aspirin EC tablet 81 mg, Daily      atorvastatin (LIPITOR) tablet 20 mg, Daily      lisinopril (PRINIVIL;ZESTRIL) tablet 20 mg, Daily      glucose (GLUTOSE) 40 % oral gel 15 g, PRN      dextrose 50 % IV solution, PRN      glucagon (rDNA) injection 1 mg, PRN      dextrose 5 % solution, PRN      sodium chloride flush 0.9 % injection 10 mL, 2 times per day      sodium chloride flush 0.9 % injection 10 mL, PRN      enoxaparin (LOVENOX) injection 40 mg, Daily      promethazine (PHENERGAN) tablet 12.5 mg, Q6H PRN    Or      ondansetron (ZOFRAN) injection 4 mg, Q6H PRN      polyethylene glycol (GLYCOLAX) packet 17 g, Daily PRN      acetaminophen (TYLENOL) tablet 650 mg, Q6H PRN    Or      acetaminophen (TYLENOL) suppository 650 mg, Q6H PRN      insulin lispro (1 Unit Dial) 0-12 Units, TID WC      insulin lispro (1 Unit Dial) 0-6 Units, Nightly        Objective:  /78   Pulse 65   Temp 98 °F (36.7 °C) (Oral)   Resp 18   Ht 5' 10\" (1.778 m)   Wt 249 lb 5 oz (113.1 kg)   SpO2 94%   BMI 35.77 kg/m²     Intake/Output Summary (Last 24 hours) at 2/20/2021 1233  Last data filed at 2/20/2021 1110  Gross per 24 hour   Intake 250 ml   Output    Net 250 ml      Wt Readings from Last 3 Encounters:   02/19/21 249 lb 5 oz (113.1 kg)   01/19/21 252 lb (114.3 kg)   07/14/20 250 lb (113.4 kg)       General appearance:  Appears comfortable Eyes: Sclera clear. Pupils equal.  ENT: Moist oral mucosa. Trachea midline, no adenopathy. Cardiovascular: Regular rhythm, normal S1, S2. No murmur. No edema in lower extremities  Respiratory: Not using accessory muscles. Good inspiratory effort. Clear to auscultation bilaterally, no wheeze or crackles. GI: Abdomen soft, no tenderness, not distended, normal bowel sounds  Musculoskeletal: No cyanosis in digits, neck supple  Neurology: CN 2-12 grossly intact. No speech or motor deficits  Psych: Normal affect. Alert and oriented in time, place and person  Skin: Warm, dry, normal turgor    Labs and Tests:  CBC:   Recent Labs     02/19/21  1335   WBC 11.9*   HGB 15.3        BMP:    Recent Labs     02/19/21  1335      K 4.5      CO2 25   BUN 23*   CREATININE 0.9   GLUCOSE 107*     Hepatic:   Recent Labs     02/19/21  1335   AST 32   ALT 41*   BILITOT 1.1*   ALKPHOS 79     ECHO   -Technically difficult study   -Left ventricle is normal in size and function.   -Ejection fraction is visually estimated to be 60%. -There is mild concentric left ventricular hypertrophy.   -No regional wall motion abnormalities are noted. -Normal diastolic function. E/e' = 11.2.   -Trivial mitral regurgitation.   -Mild tricuspid regurgitation.   -The right ventricle is upper normal in size to mildly enlarged, function   appears low normal to mildly reduced with prominent moderator band vs apical   trabeculae.       GXT   Normal LVEF >60%    Normal wall motion    Small to moderate area of inferior wall ischemia        Overall, this would be considered an abnormal, intermediate risk, study     Problem List  Active Problems:    Chest pain  Resolved Problems:    * No resolved hospital problems. *       Assessment & Plan:   1. Angina-patient with normal troponins, abnormal stress test suggestive of inferior ischemia. Consult cardiology. 2. Nicotine use-counseled  3.  Deafness-patient is able to read lips Diet: DIET CARDIAC; Carb Control: 5 carb choices (75 gms)/meal; No Added Salt (3-4 GM)  Code:Full Code  DVT PPX: rocco Adames PA-C   2/20/2021 12:33 PM

## 2021-02-20 NOTE — PROGRESS NOTES
Patient instructed on [place] Protocol Stress Test Procedure including possible side effects and adverse reactions. Verbalizes knowledge and understanding and denies having any questions.

## 2021-02-21 LAB
ANION GAP SERPL CALCULATED.3IONS-SCNC: 9 MMOL/L (ref 3–16)
APTT: 48.5 SEC (ref 24.2–36.2)
BUN BLDV-MCNC: 25 MG/DL (ref 7–20)
CALCIUM SERPL-MCNC: 9.4 MG/DL (ref 8.3–10.6)
CHLORIDE BLD-SCNC: 100 MMOL/L (ref 99–110)
CO2: 25 MMOL/L (ref 21–32)
CREAT SERPL-MCNC: 1.1 MG/DL (ref 0.9–1.3)
GFR AFRICAN AMERICAN: >60
GFR NON-AFRICAN AMERICAN: >60
GLUCOSE BLD-MCNC: 111 MG/DL (ref 70–99)
GLUCOSE BLD-MCNC: 118 MG/DL (ref 70–99)
GLUCOSE BLD-MCNC: 131 MG/DL (ref 70–99)
GLUCOSE BLD-MCNC: 90 MG/DL (ref 70–99)
GLUCOSE BLD-MCNC: 95 MG/DL (ref 70–99)
PERFORMED ON: ABNORMAL
PERFORMED ON: ABNORMAL
PERFORMED ON: NORMAL
PERFORMED ON: NORMAL
POTASSIUM REFLEX MAGNESIUM: 4.8 MMOL/L (ref 3.5–5.1)
SODIUM BLD-SCNC: 134 MMOL/L (ref 136–145)

## 2021-02-21 PROCEDURE — G0378 HOSPITAL OBSERVATION PER HR: HCPCS

## 2021-02-21 PROCEDURE — 80048 BASIC METABOLIC PNL TOTAL CA: CPT

## 2021-02-21 PROCEDURE — 36415 COLL VENOUS BLD VENIPUNCTURE: CPT

## 2021-02-21 PROCEDURE — 2580000003 HC RX 258: Performed by: FAMILY MEDICINE

## 2021-02-21 PROCEDURE — 83036 HEMOGLOBIN GLYCOSYLATED A1C: CPT

## 2021-02-21 PROCEDURE — 6360000002 HC RX W HCPCS: Performed by: INTERNAL MEDICINE

## 2021-02-21 PROCEDURE — 85730 THROMBOPLASTIN TIME PARTIAL: CPT

## 2021-02-21 PROCEDURE — 1200000000 HC SEMI PRIVATE

## 2021-02-21 PROCEDURE — 6370000000 HC RX 637 (ALT 250 FOR IP): Performed by: INTERNAL MEDICINE

## 2021-02-21 PROCEDURE — 99233 SBSQ HOSP IP/OBS HIGH 50: CPT | Performed by: INTERNAL MEDICINE

## 2021-02-21 PROCEDURE — 6370000000 HC RX 637 (ALT 250 FOR IP): Performed by: FAMILY MEDICINE

## 2021-02-21 RX ADMIN — Medication 10 ML: at 20:36

## 2021-02-21 RX ADMIN — HEPARIN SODIUM 8.8 UNITS/KG/HR: 10000 INJECTION, SOLUTION INTRAVENOUS at 19:04

## 2021-02-21 RX ADMIN — METOPROLOL TARTRATE 25 MG: 25 TABLET, FILM COATED ORAL at 20:34

## 2021-02-21 RX ADMIN — INSULIN LISPRO 1 UNITS: 100 INJECTION, SOLUTION INTRAVENOUS; SUBCUTANEOUS at 01:25

## 2021-02-21 RX ADMIN — ATORVASTATIN CALCIUM 20 MG: 20 TABLET, FILM COATED ORAL at 09:16

## 2021-02-21 RX ADMIN — ASPIRIN 81 MG: 81 TABLET, FILM COATED ORAL at 09:16

## 2021-02-21 ASSESSMENT — PAIN SCALES - GENERAL: PAINLEVEL_OUTOF10: 0

## 2021-02-21 NOTE — PROGRESS NOTES
[address]           Cardiology                                                                Progress Note    Admission date:  2021    Subjective:   CC chest pain  HPI pt in bed, feels well. Rhythm has been sinus. Vitals:  Blood pressure (!) 103/58, pulse 57, temperature 98.2 °F (36.8 °C), temperature source Oral, resp. rate 18, height 5' 10\" (1.778 m), weight 249 lb 5 oz (113.1 kg), SpO2 94 %.   Temp  Av.9 °F (36.6 °C)  Min: 97.5 °F (36.4 °C)  Max: 98.2 °F (36.8 °C)  Pulse  Av  Min: 51  Max: 65  BP  Min: 92/53  Max: 112/54  SpO2  Av.2 %  Min: 94 %  Max: 95 %    24 hour I/O    Intake/Output Summary (Last 24 hours) at 2021 1336  Last data filed at 2021 0915  Gross per 24 hour   Intake 480 ml   Output    Net 480 ml       Objective:     Telemetry monitor: SR    Physical Exam:  General Appearance:  comfortable  HEENT: pupils equal and reactive, no scleral  Icterus, auditory deficit  Skin:  Warm and dry  Heart:  Regular, normal apex, S1 and S2 normal  Lungs:  Clear, no wheezing  Abd: soft, non tender  Extremities:  No edema, no cyanosis  Psych: normal affect, oriented X 3      Lab Review     Renal Profile:   Lab Results   Component Value Date    CREATININE 1.1 2021    BUN 25 2021     2021    K 4.8 2021     2021    CO2 25 2021     CBC:    Lab Results   Component Value Date    WBC 13.1 2021    RBC 4.79 2021    HGB 15.1 2021    HCT 45.0 2021    MCV 94.0 2021    RDW 12.8 2021     2021     BNP:  No results found for: BNP  Fasting Lipid Panel:    Lab Results   Component Value Date    CHOL 112 2020    HDL 32 2020    HDL 34 2010    TRIG 101 2020     Cardiac Enzymes:  CK/MbTroponin  Lab Results   Component Value Date    TROPONINI <0.01 2021     PT/ INR   Lab Results   Component Value Date    INR 1.06 2021    PROTIME 12.3 2021 PTT No results found for: PTT No results found for: MG   Lab Results   Component Value Date    TSH 1.49 03/18/2013       Assessment:     1. Unstable angina  2. Abnormal GXT  3. HTN  4. DM    Plan:     1. ASA, heparin, beta blocker, statin  2.  Cardiac cath 2/22/21        Sandy Hickey MD

## 2021-02-21 NOTE — PROGRESS NOTES
[facility] PROGRESS NOTE    2/21/2021 8:30 AM        Name: Fernando Foy . Admitted: 2/19/2021  Primary Care Provider: Renetta Livingston MD (Tel: [phone])      Subjective:  . Admitted with chest pain. He was shoveling snow a few days ago. He had associated fatigue. Denies chest pain or shortness of breath today. He was started on heparin drip yesterday in anticipation of cath on Monday. Patient is legally deaf but is able to read lips.     Reviewed interval ancillary notes    Current Medications      heparin (porcine) injection 4,000 Units, PRN      heparin (porcine) injection 2,000 Units, PRN      heparin 25,000 units in dextrose 5% 250 mL (premix) infusion, Continuous      metoprolol tartrate (LOPRESSOR) tablet 25 mg, BID      aspirin EC tablet 81 mg, Daily      atorvastatin (LIPITOR) tablet 20 mg, Daily      lisinopril (PRINIVIL;ZESTRIL) tablet 20 mg, Daily      glucose (GLUTOSE) 40 % oral gel 15 g, PRN      dextrose 50 % IV solution, PRN      glucagon (rDNA) injection 1 mg, PRN      dextrose 5 % solution, PRN      sodium chloride flush 0.9 % injection 10 mL, 2 times per day      sodium chloride flush 0.9 % injection 10 mL, PRN      promethazine (PHENERGAN) tablet 12.5 mg, Q6H PRN    Or      ondansetron (ZOFRAN) injection 4 mg, Q6H PRN      polyethylene glycol (GLYCOLAX) packet 17 g, Daily PRN      acetaminophen (TYLENOL) tablet 650 mg, Q6H PRN    Or      acetaminophen (TYLENOL) suppository 650 mg, Q6H PRN      insulin lispro (1 Unit Dial) 0-12 Units, TID WC      insulin lispro (1 Unit Dial) 0-6 Units, Nightly        Objective:  /64   Pulse 51   Temp 97.5 °F (36.4 °C) (Oral)   Resp 16   Ht 5' 10\" (1.778 m)   Wt 249 lb 5 oz (113.1 kg)   SpO2 94%   BMI 35.77 kg/m²     Intake/Output Summary (Last 24 hours) at 2/21/2021 0830  Last data filed at 2/20/2021 1110 Gross per 24 hour   Intake 10 ml   Output    Net 10 ml      Wt Readings from Last 3 Encounters:   02/19/21 249 lb 5 oz (113.1 kg)   01/19/21 252 lb (114.3 kg)   07/14/20 250 lb (113.4 kg)       General appearance:  Appears comfortable  Eyes: Sclera clear. Pupils equal.  ENT: Moist oral mucosa. Trachea midline, no adenopathy. Cardiovascular: Regular rhythm, normal S1, S2. No murmur. No edema in lower extremities  Respiratory: Not using accessory muscles. Good inspiratory effort. Clear to auscultation bilaterally, no wheeze or crackles. GI: Abdomen soft, no tenderness, not distended, normal bowel sounds  Musculoskeletal: No cyanosis in digits, neck supple  Neurology: CN 2-12 grossly intact. No speech or motor deficits  Psych: Normal affect. Alert and oriented in time, place and person  Skin: Warm, dry, normal turgor    Labs and Tests:  CBC:   Recent Labs     02/19/21  1335 02/20/21  1731   WBC 11.9* 13.1*   HGB 15.3 15.1    271     BMP:    Recent Labs     02/19/21  1335      K 4.5      CO2 25   BUN 23*   CREATININE 0.9   GLUCOSE 107*     Hepatic:   Recent Labs     02/19/21  1335   AST 32   ALT 41*   BILITOT 1.1*   ALKPHOS 79     ECHO   -Technically difficult study   -Left ventricle is normal in size and function.   -Ejection fraction is visually estimated to be 60%. -There is mild concentric left ventricular hypertrophy.   -No regional wall motion abnormalities are noted. -Normal diastolic function.  E/e' = 11.2.   -Trivial mitral regurgitation.   -Mild tricuspid regurgitation.   -The right ventricle is upper normal in size to mildly enlarged, function   appears low normal to mildly reduced with prominent moderator band vs apical   trabeculae.       GXT   Normal LVEF >60%    Normal wall motion    Small to moderate area of inferior wall ischemia        Overall, this would be considered an abnormal, intermediate risk, study     Problem List  Active Problems:    Essential hypertension, benign

## 2021-02-21 NOTE — CONSULTS
[facility]                     [facility], [address]                                  CONSULTATION    PATIENT NAME: Zafar Clay                    :        1965  MED REC NO:   [id number]                          ROOM:       3322  ACCOUNT NO:   [de-identified]                           ADMIT DATE: 2021  PROVIDER:     Chacho May MD    CONSULT DATE:  2021    REASON FOR CONSULTATION:  Chest pain. HISTORY OF PRESENT ILLNESS:  The patient is a 59-year-old man with  history of hypertension, hyperlipidemia and diabetes, who is admitted  for chest discomfort. The patient reports some retrosternal left-sided  chest discomfort associated with some shoulder pain over the past two to  three days. This is generally exertional in nature, but has required  less and less exertion to reproduce these symptoms. With rest, the  symptoms resolved after about 5 minute. ECG at the time of admission  demonstrated some nonspecific T wave abnormalities. _____ Myoview on  2021 demonstrates reversible inferior defect. The patient has  multiple risk factors for coronary artery disease including  hypertension, diabetes, hyperlipidemia, cigarette smoking and family  history. The patient's sister just had three stents placed over the  past few weeks. Troponin levels have been negative. PAST MEDICAL HISTORY:  1. Congenital deafness. 2.  Hypertension. 3.  Hyperlipidemia. 4.  Diabetes. 5.  Cigarettes smoking. MEDICATIONS:  At the time of admission include aspirin 81 mg p.o. daily,  Lipitor 20 mg p.o. daily, lisinopril 10 mg p.o. daily and metformin 500  mg p.o. daily. ALLERGIES:  Include ACETAMINOPHEN. SOCIAL HISTORY:  The patient is a current everyday cigarettes smoker. He does not consume alcohol at this time. FAMILY HISTORY:  Remarkable for coronary artery disease in the mother  and one female sibling. REVIEW OF SYSTEMS:  Demonstrates no recent change in appetite or change  in weight. The patient has not had recent fever or chills. He does not  describe palpitations, near syncope or syncope. He describes the  above-mentioned chest discomfort. All other components of the 14-system  review are negative. PHYSICAL EXAMINATION:  GENERAL:  The patient is awake and alert. He is somewhat hard of  hearing, but is able to hear better in the left ear. VITAL SIGNS:  Blood pressure is 135/72, heart rate is 65 beats per  minute and regular. HEENT:  Exam demonstrates normocephalic and atraumatic head. There is  no scleral icterus. Pupils are round and reactive. NECK:  Supple without thyromegaly. LUNGS:  Clear to auscultation. CARDIOVASCULAR:  Reveals a regular rhythm. The apical pulse is  discrete. S1 and S2 are normal.  There is no audible murmur or gallop. The jugular venous pressure is difficult to assess. ABDOMEN:  Obese, soft and nontender. EXTREMITIES:  Demonstrate no pitting, pretibial or dependent edema. There is no cyanosis. There is no evidence for chronic venous stasis. SKIN:  Otherwise warm and dry without skin rash. A 12-lead ECG from 02/19/2021 demonstrates ectopic atrial rhythm at 77  beats per minute. There is T wave flattening in the inferior leads. IMPRESSION:  1. Unstable angina. 2.  Hypertension. 3.  Hyperlipidemia. 4.  Diabetes mellitus. 5.  Cigarette smoking. The patient presents with a pattern of accelerating angina. Though his  chest pain is still exertional, the episodes are more frequent and  longer lasting. He does not have any clear ECG evidence for myocardial  injury or ischemia at the time of admission, but does have nonspecific T  wave abnormalities in the inferior leads. His Myoview imaging does  demonstrate reversible defect in the inferior wall.   He has multiple  risk factors for coronary artery disease and the likelihood that this represents a false positive is remote. We will initiate therapy with IV  heparin, beta-blocker and continue the aspirin and statin agent. Cardiac catheterization with coronary angiography was discussed with the  patient and will be performed on 02/22/2021. RECOMMENDATIONS:  1. Continue aspirin and statin. 2.  Add beta-blocker. 3.  IV heparin. 4.  Wound plan for cardiac catheterization on 02/22/2021. Thanks for opportunity to assist in care of the patient. Please contact  me if you have any questions regarding his evaluation.         Bluford Rinne, MD    D: 02/20/2021 17:13:24       T: 02/20/2021 20:42:49     [email or web address]  Job#: [id number]     Doc#: [id number]    CC:

## 2021-02-21 NOTE — PROGRESS NOTES
Noted last 2 aPTTs were therapeutic. Change to daily draws at 0600. Will continue to monitor for any signs and symptoms of abnormal bruising or bleeding.

## 2021-02-22 VITALS
RESPIRATION RATE: 18 BRPM | HEART RATE: 56 BPM | TEMPERATURE: 97.7 F | SYSTOLIC BLOOD PRESSURE: 119 MMHG | DIASTOLIC BLOOD PRESSURE: 77 MMHG | BODY MASS INDEX: 35.69 KG/M2 | WEIGHT: 249.31 LBS | HEIGHT: 70 IN | OXYGEN SATURATION: 95 %

## 2021-02-22 LAB
APTT: 40 SEC (ref 24.2–36.2)
ESTIMATED AVERAGE GLUCOSE: 131.2 MG/DL
GLUCOSE BLD-MCNC: 114 MG/DL (ref 70–99)
GLUCOSE BLD-MCNC: 94 MG/DL (ref 70–99)
HBA1C MFR BLD: 6.2 %
HCT VFR BLD CALC: 45.3 % (ref 40.5–52.5)
HEMOGLOBIN: 15.4 G/DL (ref 13.5–17.5)
LEFT VENTRICULAR EJECTION FRACTION MODE: NORMAL
LV EF: 60 %
MCH RBC QN AUTO: 31.7 PG (ref 26–34)
MCHC RBC AUTO-ENTMCNC: 34 G/DL (ref 31–36)
MCV RBC AUTO: 93.3 FL (ref 80–100)
PDW BLD-RTO: 12.8 % (ref 12.4–15.4)
PERFORMED ON: ABNORMAL
PERFORMED ON: NORMAL
PERFORMED ON: NORMAL
PLATELET # BLD: 250 K/UL (ref 135–450)
PMV BLD AUTO: 9.8 FL (ref 5–10.5)
POC ACT LR: 223 SEC
POC ACT LR: 270 SEC
POC ACT LR: 348 SEC
RBC # BLD: 4.86 M/UL (ref 4.2–5.9)
WBC # BLD: 11.3 K/UL (ref 4–11)

## 2021-02-22 PROCEDURE — C1874 STENT, COATED/COV W/DEL SYS: HCPCS

## 2021-02-22 PROCEDURE — 93571 IV DOP VEL&/PRESS C FLO 1ST: CPT | Performed by: INTERNAL MEDICINE

## 2021-02-22 PROCEDURE — C1894 INTRO/SHEATH, NON-LASER: HCPCS

## 2021-02-22 PROCEDURE — 2500000003 HC RX 250 WO HCPCS

## 2021-02-22 PROCEDURE — 93458 L HRT ARTERY/VENTRICLE ANGIO: CPT

## 2021-02-22 PROCEDURE — 2709999900 HC NON-CHARGEABLE SUPPLY

## 2021-02-22 PROCEDURE — C1753 CATH, INTRAVAS ULTRASOUND: HCPCS

## 2021-02-22 PROCEDURE — 4A023N7 MEASUREMENT OF CARDIAC SAMPLING AND PRESSURE, LEFT HEART, PERCUTANEOUS APPROACH: ICD-10-PCS | Performed by: INTERNAL MEDICINE

## 2021-02-22 PROCEDURE — 93571 IV DOP VEL&/PRESS C FLO 1ST: CPT

## 2021-02-22 PROCEDURE — C1887 CATHETER, GUIDING: HCPCS

## 2021-02-22 PROCEDURE — 92978 ENDOLUMINL IVUS OCT C 1ST: CPT

## 2021-02-22 PROCEDURE — 2580000003 HC RX 258

## 2021-02-22 PROCEDURE — 6370000000 HC RX 637 (ALT 250 FOR IP): Performed by: FAMILY MEDICINE

## 2021-02-22 PROCEDURE — 2580000003 HC RX 258: Performed by: INTERNAL MEDICINE

## 2021-02-22 PROCEDURE — 027034Z DILATION OF CORONARY ARTERY, ONE ARTERY WITH DRUG-ELUTING INTRALUMINAL DEVICE, PERCUTANEOUS APPROACH: ICD-10-PCS | Performed by: INTERNAL MEDICINE

## 2021-02-22 PROCEDURE — 99152 MOD SED SAME PHYS/QHP 5/>YRS: CPT

## 2021-02-22 PROCEDURE — C1769 GUIDE WIRE: HCPCS

## 2021-02-22 PROCEDURE — 6370000000 HC RX 637 (ALT 250 FOR IP)

## 2021-02-22 PROCEDURE — 99024 POSTOP FOLLOW-UP VISIT: CPT | Performed by: INTERNAL MEDICINE

## 2021-02-22 PROCEDURE — 92928 PRQ TCAT PLMT NTRAC ST 1 LES: CPT | Performed by: INTERNAL MEDICINE

## 2021-02-22 PROCEDURE — 85027 COMPLETE CBC AUTOMATED: CPT

## 2021-02-22 PROCEDURE — C9600 PERC DRUG-EL COR STENT SING: HCPCS

## 2021-02-22 PROCEDURE — 6370000000 HC RX 637 (ALT 250 FOR IP): Performed by: INTERNAL MEDICINE

## 2021-02-22 PROCEDURE — C1725 CATH, TRANSLUMIN NON-LASER: HCPCS

## 2021-02-22 PROCEDURE — 85730 THROMBOPLASTIN TIME PARTIAL: CPT

## 2021-02-22 PROCEDURE — 93458 L HRT ARTERY/VENTRICLE ANGIO: CPT | Performed by: INTERNAL MEDICINE

## 2021-02-22 PROCEDURE — B2111ZZ FLUOROSCOPY OF MULTIPLE CORONARY ARTERIES USING LOW OSMOLAR CONTRAST: ICD-10-PCS | Performed by: INTERNAL MEDICINE

## 2021-02-22 PROCEDURE — 92978 ENDOLUMINL IVUS OCT C 1ST: CPT | Performed by: INTERNAL MEDICINE

## 2021-02-22 PROCEDURE — 6360000004 HC RX CONTRAST MEDICATION: Performed by: INTERNAL MEDICINE

## 2021-02-22 PROCEDURE — 2720000010 HC SURG SUPPLY STERILE

## 2021-02-22 PROCEDURE — 85347 COAGULATION TIME ACTIVATED: CPT

## 2021-02-22 PROCEDURE — 36415 COLL VENOUS BLD VENIPUNCTURE: CPT

## 2021-02-22 PROCEDURE — 99153 MOD SED SAME PHYS/QHP EA: CPT

## 2021-02-22 PROCEDURE — 6360000002 HC RX W HCPCS

## 2021-02-22 RX ORDER — SODIUM CHLORIDE 0.9 % (FLUSH) 0.9 %
10 SYRINGE (ML) INJECTION PRN
Status: DISCONTINUED | OUTPATIENT
Start: 2021-02-22 | End: 2021-02-22 | Stop reason: HOSPADM

## 2021-02-22 RX ORDER — ACETAMINOPHEN 325 MG/1
650 TABLET ORAL EVERY 4 HOURS PRN
Status: DISCONTINUED | OUTPATIENT
Start: 2021-02-22 | End: 2021-02-22 | Stop reason: HOSPADM

## 2021-02-22 RX ORDER — CLOPIDOGREL BISULFATE 75 MG/1
75 TABLET ORAL DAILY
Qty: 30 TABLET | Refills: 1 | Status: SHIPPED | OUTPATIENT
Start: 2021-02-23 | End: 2021-03-22 | Stop reason: SDUPTHER

## 2021-02-22 RX ORDER — OXYCODONE HYDROCHLORIDE AND ACETAMINOPHEN 5; 325 MG/1; MG/1
2 TABLET ORAL EVERY 4 HOURS PRN
Status: DISCONTINUED | OUTPATIENT
Start: 2021-02-22 | End: 2021-02-22 | Stop reason: HOSPADM

## 2021-02-22 RX ORDER — ONDANSETRON 2 MG/ML
4 INJECTION INTRAMUSCULAR; INTRAVENOUS EVERY 6 HOURS PRN
Status: DISCONTINUED | OUTPATIENT
Start: 2021-02-22 | End: 2021-02-22 | Stop reason: SDUPTHER

## 2021-02-22 RX ORDER — SODIUM CHLORIDE 0.9 % (FLUSH) 0.9 %
10 SYRINGE (ML) INJECTION EVERY 12 HOURS SCHEDULED
Status: DISCONTINUED | OUTPATIENT
Start: 2021-02-22 | End: 2021-02-22 | Stop reason: HOSPADM

## 2021-02-22 RX ORDER — ATORVASTATIN CALCIUM 40 MG/1
40 TABLET, FILM COATED ORAL DAILY
Status: DISCONTINUED | OUTPATIENT
Start: 2021-02-22 | End: 2021-02-22 | Stop reason: HOSPADM

## 2021-02-22 RX ORDER — SODIUM CHLORIDE 9 MG/ML
INJECTION, SOLUTION INTRAVENOUS CONTINUOUS
Status: DISCONTINUED | OUTPATIENT
Start: 2021-02-22 | End: 2021-02-22 | Stop reason: HOSPADM

## 2021-02-22 RX ORDER — ATORVASTATIN CALCIUM 40 MG/1
40 TABLET, FILM COATED ORAL DAILY
Qty: 30 TABLET | Refills: 1 | Status: SHIPPED | OUTPATIENT
Start: 2021-02-23 | End: 2021-03-16 | Stop reason: SDUPTHER

## 2021-02-22 RX ORDER — CLOPIDOGREL BISULFATE 75 MG/1
75 TABLET ORAL DAILY
Status: DISCONTINUED | OUTPATIENT
Start: 2021-02-23 | End: 2021-02-22 | Stop reason: HOSPADM

## 2021-02-22 RX ORDER — OXYCODONE HYDROCHLORIDE AND ACETAMINOPHEN 5; 325 MG/1; MG/1
1 TABLET ORAL EVERY 4 HOURS PRN
Status: DISCONTINUED | OUTPATIENT
Start: 2021-02-22 | End: 2021-02-22 | Stop reason: HOSPADM

## 2021-02-22 RX ADMIN — ATORVASTATIN CALCIUM 40 MG: 20 TABLET, FILM COATED ORAL at 10:42

## 2021-02-22 RX ADMIN — SODIUM CHLORIDE: 9 INJECTION, SOLUTION INTRAVENOUS at 11:06

## 2021-02-22 RX ADMIN — LISINOPRIL 20 MG: 10 TABLET ORAL at 10:42

## 2021-02-22 RX ADMIN — IOPAMIDOL 130 ML: 755 INJECTION, SOLUTION INTRAVENOUS at 10:12

## 2021-02-22 ASSESSMENT — PAIN SCALES - GENERAL: PAINLEVEL_OUTOF10: 0

## 2021-02-22 NOTE — PROGRESS NOTES
Data- discharge order received, pt & wife verbalized agreement to discharge, disposition to previous residence, no needs for HHC/DME. Action- discharge instructions prepared and given to pt, pt verbalized understanding. Medication information packet given r/t NEW and/or CHANGED prescriptions emphasizing name/purpose/side effects, pt verbalized understanding. Discharge instruction summary: Diet- Cardiac (low fat and low sodium), Activity- as tolerated with limited lifting restrictions to right wrist s/p LHC with PCI placement, Primary Care Physician as follows: Jose Mcclellan [phone] f/u appointment scheduled and pt to follow up with cardiology at scheduled appointment 03/01/2021, prescription medications set to [facility] outpatient pharmacy. Reviewed post C restrictions and pt/wife verbalized understanding. Response- Pt belongings gathered, IV removed. Disposition is home (no HHC/DME needs), transported to [facility] independently w/ belongings, no complications.

## 2021-02-22 NOTE — PROGRESS NOTES
Pt returned from cath lab after LHC with PCI to Mid LAD, Right radial band on with 11 ml of air. To start removal at 1115. Cardiology OK with DC after restrictions are over.

## 2021-02-22 NOTE — PRE SEDATION
Brief Pre-Op Note/Sedation Assessment      Cole Britton  1965  [id number]      [id number]  8:09 AM    Planned Procedure: Cardiac Catheterization Procedure    Post Procedure Plan: Return to same level of care    Consent: I have discussed with the patient and/or the patient representative the indication, alternatives, and the possible risks and/or complications of the planned procedure and the anesthesia methods. The patient and/or patient representative appear to understand and agree to proceed. Chief Complaint: Chest Pain/Pressure  Anginal Equivalent      Indications for Cath Procedure:  New Onset Angina <= 2 months, Worsening Angina and Suspected CAD  Anginal Classification within 2 weeks:  CCS III - Symptoms with everyday living activities, i.e., moderate limitation  NYHA Heart Failure Class within 2 weeks: No symptoms  Is Cath Lab Visit Valve-related?: No  Surgical Risk: Intermediate  Functional Type: >= 4 METS with symptoms    Anti- Anginal Meds within 2 weeks:   Yes: Beta Blockers and Aspirin    Stress or Imaging Studies Performed:  Stress Test with SPECT Result: Positive:  inferior Risk/Extent of Ischemia:  High     Vital Signs:  BP (!) 112/59   Pulse 59   Temp 97.5 °F (36.4 °C) (Oral)   Resp 18   Ht 5' 10\" (1.778 m)   Wt 249 lb 5 oz (113.1 kg)   SpO2 95%   BMI 35.77 kg/m²     Allergies:   Allergies   Allergen Reactions    Tylenol [Acetaminophen] Shortness Of Breath     Tylenol 3, makes him feel like hes having a heart attack, however can take Vicodin       Past Medical History:  Past Medical History:   Diagnosis Date    Back pain     Congenital deafness     Deaf     Hypercholesteremia     Hyperglycemia     ADA (obstructive sleep apnea)     PONV (postoperative nausea and vomiting)     Prolonged emergence from general anesthesia          Surgical History:  Past Surgical History:   Procedure Laterality Date    HERNIA REPAIR  1994    x2    INGUINAL HERNIA REPAIR  7/1/15    [facility]  KIDNEY STONE SURGERY           Medications:  Current Facility-Administered Medications   Medication Dose Route Frequency Provider Last Rate Last Admin    heparin (porcine) injection 4,000 Units  4,000 Units Intravenous PRN Miguel Farah MD        heparin (porcine) injection 2,000 Units  2,000 Units Intravenous PRN Miguel Farah MD        heparin 25,000 units in dextrose 5% 250 mL (premix) infusion  8.8 Units/kg/hr Intravenous Continuous Miguel Farah MD 10 mL/hr at 02/21/21 1904 8.8 Units/kg/hr at 02/21/21 1904    metoprolol tartrate (LOPRESSOR) tablet 25 mg  25 mg Oral BID Miguel Farah MD   25 mg at 02/21/21 2034    aspirin EC tablet 81 mg  81 mg Oral Daily Yajaira Arenas MD   81 mg at 02/21/21 0916    atorvastatin (LIPITOR) tablet 20 mg  20 mg Oral Daily Yajaira Arenas MD   20 mg at 02/21/21 0916    lisinopril (PRINIVIL;ZESTRIL) tablet 20 mg  20 mg Oral Daily Yajaira Arenas MD   20 mg at 02/20/21 1054    glucose (GLUTOSE) 40 % oral gel 15 g  15 g Oral PRN Yajaira Arenas MD        dextrose 50 % IV solution  12.5 g Intravenous PRN Yajaira Arenas MD        glucagon (rDNA) injection 1 mg  1 mg Intramuscular PRN Yajaira Arenas MD        dextrose 5 % solution  100 mL/hr Intravenous PRN Yajaira Arenas MD        sodium chloride flush 0.9 % injection 10 mL  10 mL Intravenous 2 times per day Yajaira Arenas MD   10 mL at 02/21/21 2036    sodium chloride flush 0.9 % injection 10 mL  10 mL Intravenous PRN Yajaira Arenas MD        promethazine (PHENERGAN) tablet 12.5 mg  12.5 mg Oral Q6H PRN Yajaira Arenas MD        Or    ondansetron (ZOFRAN) injection 4 mg  4 mg Intravenous Q6H PRN Yajaira Arenas MD        polyethylene glycol (GLYCOLAX) packet 17 g  17 g Oral Daily PRN Yajaira Arenas MD        acetaminophen (TYLENOL) tablet 650 mg  650 mg Oral Q6H PRN Yajaira Arenas MD        Or   Suzy Calzada acetaminophen (TYLENOL) suppository 650 mg  650 mg Rectal Q6H PRN Yajaira Arenas MD

## 2021-02-22 NOTE — PROGRESS NOTES
CLINICAL PHARMACY NOTE: MEDS TO [address] Select Patient?: Yes  Total # of Prescriptions Filled: 3   The following medications were delivered to the patient:  · Metoprolol tartrate 25mg  · Clopidogrel 75mg  Total # of Interventions Completed: 0  Time Spent (min): 30    Additional Documentation:  Wife denied Atorvastatin, stated patient just filled 90 days. Profiled.   Medications picked up by wife in [place] [id number]

## 2021-02-22 NOTE — OP NOTE
Patient:  Phil Lockhart   :   1965    Procedural Summary  ~Consent:   Obtained written and verbal consent      Risks/benefits explained in detail  ~Procedure:    Left Heart Catheterization  ~Medications:    Procedural sedation with minimal conscious sedation  ~Complications:   None  ~Blood Loss:    <10cc  ~Specimens:    None obtained  ~Pre-sedation re-evaluation: Performed immediately prior to procedure. Medication and Procedural Reconciliation:  An independent trained observer pushed medications at my direction. We monitored the patient's level of consciousness and vital signs/physiologic status throughout the procedure duration (see start and stop times below). Sedation: 6 mg Versed, 300 mcg Fentanyl  Sedation start: 47  Sedation stop: 1007    Cardiac Cath PCI, FFR, IVUS:  Anatomy:   LM-short   LAD-prox 70%  Cx-mid 40%  OM- nml  RCA-dominant, mid 20%  RPDA- nml  LVEF- 60  LVG- nml  LVEDP- 10-15    FFR LAD 0.75    Intervention  ~Successful PCI to LAD with 4.0x28 AMNA to 18atm. IVUS showed under-expanded stent. Post dilated with 4.5x15 NC to 18atm. Excellent Result. Contrast: 13  Flouro Time: 21.5  Access: R radial a    Impression  ~Coronary Angiography w/ severe single vessel CAD  ~LVG with LVEF of 60 and no regional wall motion abnormalities  ~Successful complex angioplasty and stenting of LAD        Recommendation  ~Aggressive medical treatment and risk factor modification  ~1. Stop heparin gtt. Post cath IVF. Bedrest.  2. Recommend beta blocker, high potency statin, aspirin and plavix for 6-12 months  3. Referral to cardiac rehab placed  4. Patient has been advised on the importance of regular exercise of at least 20-30 minutes daily. 5. Patient counseled about and offered assistance for smoking cessation   6. OK to discharge home today. Follow up in 1-2 weeks with cardiology.             Peterson Hoover MD 2021 10:28 AM

## 2021-02-22 NOTE — DISCHARGE SUMMARY
[facility]    Patient Demographics    Patient. Allan Vasquez  Date of Birth. 1965  MRN. [id number]     Primary care provider. Stacey Marc MD  (Tel: [phone])    Admit date: 2/19/2021    Discharge date (blank if same as Note Date): Note Date: 2/22/2021     Reason for Hospitalization. Chief Complaint   Patient presents with    Shortness of Breath     pt with increases sob and CP on and off, worse with exertion, no known covid contacts but concerned. Significant Findings. Active Problems:  CAD    Essential hypertension, benign    Mixed hyperlipidemia    Chest pain    Unstable angina (HCC)       Problems and results from this hospitalization that need follow up. 1. CAD    Significant test results and incidental findings. 1. GXT   Summary    Normal LVEF >60%    Normal wall motion    Small to moderate area of inferior wall ischemia        Overall, this would be considered an abnormal, intermediate risk, study     Cardiac Cath PCI, FFR, IVUS:  Anatomy:   LM-short   LAD-prox 70%  Cx-mid 40%  OM- nml  RCA-dominant, mid 20%  RPDA- nml  LVEF- 60  LVG- nml  LVEDP- 10-15     FFR LAD 0.75  Invasive procedures and treatments. 1. L heart cath with stenting of the LAD by Dr Charlie Duran on 2/23    [facility] Course. Patient is a 51-year-old male with diabetes and hypertension who presented with shortness of breath and chest pain. In the emergency room EKG was unremarkable cardiac enzymes were normal.  This x-ray was negative. His Covid test was negative. He was admitted and underwent a stress test which suggested inferior wall ischemia. He was seen by cardiology and underwent a left heart cath by Dr. Meron Roldan and was found to have a high-grade lesion of the LAD which was stented successfully. He is on a beta-blocker, aspirin, Plavix, and a statin was discharged home in stable condition. Consults. IP CONSULT TO CARDIOLOGY  IP CONSULT TO CARDIAC REHAB  IP CONSULT TO FINANCIAL COUNSELOR    Physical examination on discharge day. /77   Pulse 56   Temp 97.7 °F (36.5 °C) (Oral)   Resp 18   Ht 5' 10\" (1.778 m)   Wt 249 lb 5 oz (113.1 kg)   SpO2 95%   BMI 35.77 kg/m²   General appearance. Alert. Looks comfortable. HEENT. Sclera clear. Moist mucus membranes. Cardiovascular. Regular rate and rhythm, normal S1, S2. No murmur. Respiratory. Not using accessory muscles. Clear to auscultation bilaterally, no wheeze. Gastrointestinal. Abdomen soft, non-tender, not distended, normal bowel sounds  Neurology. Facial symmetry. No speech deficits. Moving all extremities equally. Extremities. No edema in lower extremities. Skin. Warm, dry, normal turgor    Condition at time of discharge stable    Medication instructions provided to patient at discharge.      Medication List      START taking these medications    clopidogrel 75 MG tablet  Commonly known as: PLAVIX  Take 1 tablet by mouth daily  Start taking on: February 23, 2021     metoprolol tartrate 25 MG tablet  Commonly known as: LOPRESSOR  Take 1 tablet by mouth 2 times daily        CHANGE how you take these medications    atorvastatin 40 MG tablet  Commonly known as: LIPITOR  Take 1 tablet by mouth daily  Start taking on: February 23, 2021  What changed: · medication strength  · See the new instructions. metFORMIN 500 MG tablet  Commonly known as: GLUCOPHAGE  Take 1 tablet by mouth daily (with breakfast)  Start taking on: February 24, 2021  What changed:   · See the new instructions. · These instructions start on February 24, 2021. If you are unsure what to do until then, ask your doctor or other care provider. CONTINUE taking these medications    aspirin EC 81 MG EC tablet  Take 1 tablet by mouth daily  Notes to patient: Use: anti inflammatory used to help reduce the risk of heart attack  Side effects: increased bleeding & bruising, upset stomach & nausea     docusate sodium 100 MG capsule  Commonly known as: COLACE  Notes to patient: Use: as a stool softener to help the passage of bowels  Side effects: stomach cramping and bloating  HOLD FOR LOOSE STOOLS     lisinopril 10 MG tablet  Commonly known as: PRINIVIL;ZESTRIL  TAKE ONE TABLET BY MOUTH DAILY  Notes to patient: Treats: Heart failure or high blood pressure by decreases workload of the heart  Side effects: Dry cough, dizziness, drowsiness, sensitivity to the sun and sexual dysfunction may occur. Also changes in taste (metallic or salty) & potassium are possible. Note: Rare but emergent side effect: discontinue use and call MD right away if lips or throat to swell. STOP taking these medications    polycarbophil 625 MG tablet  Commonly known as: Sarah Oglesby           Where to Get Your Medications      These medications were sent to [facility], [address] [place] [id number]    Phone: [phone]   · atorvastatin 40 MG tablet  · clopidogrel 75 MG tablet  · metoprolol tartrate 25 MG tablet     Information about where to get these medications is not yet available    Ask your nurse or doctor about these medications  · metFORMIN 500 MG tablet         Discharge recommendations given to patient.

## 2021-02-23 ENCOUNTER — CARE COORDINATION (OUTPATIENT)
Dept: CASE MANAGEMENT | Age: 56
End: 2021-02-23

## 2021-02-23 DIAGNOSIS — R07.9 CHEST PAIN, UNSPECIFIED TYPE: Primary | ICD-10-CM

## 2021-02-23 PROCEDURE — 1111F DSCHRG MED/CURRENT MED MERGE: CPT | Performed by: INTERNAL MEDICINE

## 2021-02-23 NOTE — CARE COORDINATION
opportunity to ask questions and does not have any further questions or concerns at this time. Were discharge instructions available to patient? Yes. Reviewed appropriate site of care based on symptoms and resources available to patient including: PCP, Urgent care clinics and When to call 911. The family agrees to contact the PCP office for questions related to their healthcare. Medication reconciliation was performed with family, who verbalizes understanding of administration of home medications. Advised obtaining a 90-day supply of all daily and as-needed medications. Covid Risk Education    Patient has following risk factors of: no known risk factors. Education provided regarding infection prevention, and signs and symptoms of COVID-19 and when to seek medical attention with family who verbalized understanding. Discussed exposure protocols and quarantine From [facility][note 2]: Are you at higher risk for severe illness?   and given an opportunity for questions and concerns. The family agrees to contact the COVID-19 hotline [phone] or PCP office for questions related to COVID-19. For more information on steps you can take to protect yourself, see CDC's How to Protect Yourself     Patient/family/caregiver given information for GetWell Loop and agrees to enroll no      Discussed follow-up appointments. If no appointment was previously scheduled, appointment scheduling offered: Yes. Is follow up appointment scheduled within 7 days of discharge? Yes  Non-[facility] follow up appointment(s):     Plan for follow-up call in 1-2 days based on severity of symptoms and risk factors. Plan for next call: symptom management-., self management-. and follow up appointment-. CTN provided contact information for future needs.           Non-face-to-face services provided:  Obtained and reviewed discharge summary and/or continuity of care documents    Care Transitions 24 Hour Call    Do you have any ongoing symptoms?: No  Do you have a copy of your discharge instructions?: Yes  Do you have all of your prescriptions and are they filled?: Yes  Have you been contacted by a [employer] [address]?: No  Have you scheduled your follow up appointment?: Yes  How are you going to get to your appointment?: Car - family or friend to transport  Were you discharged with any Home Care or Post Acute Services: No  Do you feel like you have everything you need to keep you well at home?: Yes  Care Transitions Interventions         Follow Up  Future Appointments   Date Time Provider Radha Downing   3/1/2021  9:15 AM TONIA Hummel CNP [facility]   4/26/2021  9:00 AM TONIA Hummel CNP [facility]   7/19/2021 11:00 AM Fátima Weller MD [facility]       Cherelle Jones RN

## 2021-02-23 NOTE — TELEPHONE ENCOUNTER
Best I can tell, is that it was held for a pulse < 60.   Have her call in with his readings on Thursday for a better idea of what he's been running

## 2021-02-24 ENCOUNTER — CARE COORDINATION (OUTPATIENT)
Dept: CASE MANAGEMENT | Age: 56
End: 2021-02-24

## 2021-02-24 NOTE — CARE COORDINATION
CTN reached out to wife \"Alexa\", explained that cardiology would like her to log BP's and pulse rate for a few days and then call the office on Friday with readings to determine dosage of metoprolol. She verbalized understanding. CTN also encouraged her to call CTN with any further questions or concerns and confirmed that she has contact information. CTN will continue with outreach follow up calls as indicated.

## 2021-02-26 ENCOUNTER — CARE COORDINATION (OUTPATIENT)
Dept: CASE MANAGEMENT | Age: 56
End: 2021-02-26

## 2021-02-26 NOTE — CARE COORDINATION
Te [facility] Transitions Follow Up Call    2021    Patient: Dominique Wolff  Patient : 1965   MRN: [id number]  Reason for Admission: Chest pain, s/p stent placement  Discharge Date: 21 RARS: Readmission Risk Score: 10         Spoke with: Byron [facility]ILD [facility] Transitions Subsequent and Final Call    Subsequent and Final Calls  Do you have any ongoing symptoms?: No  Have your medications changed?: No  Do you have any questions related to your medications?: No  Do you currently have any active services?: No  Do you have any needs or concerns that I can assist you with?: No  Identified Barriers: None  Care Transitions Interventions  Other Interventions: Follow Up: Contacted wife, she reports that she does not know how well their BP cuff is working and patient is feeling very well, out visiting a friend today. He will be seeing the cardiologist on Monday and consult about metoprolol. CTN will continue with outreach follow up calls.     Future Appointments   Date Time Provider Radha Downing   3/1/2021  9:15 AM TONIA Martinez CNP [facility]   2021  9:00 AM TONIA Martinez CNP [facility]   2021 11:00 AM Destiny Irby MD [facility] STEPH Hernández RN

## 2021-03-01 ENCOUNTER — OFFICE VISIT (OUTPATIENT)
Dept: CARDIOLOGY CLINIC | Age: 56
End: 2021-03-01
Payer: MEDICARE

## 2021-03-01 VITALS
HEART RATE: 61 BPM | SYSTOLIC BLOOD PRESSURE: 102 MMHG | WEIGHT: 248.4 LBS | OXYGEN SATURATION: 95 % | HEIGHT: 70 IN | BODY MASS INDEX: 35.56 KG/M2 | DIASTOLIC BLOOD PRESSURE: 62 MMHG

## 2021-03-01 DIAGNOSIS — I10 ESSENTIAL HYPERTENSION, BENIGN: ICD-10-CM

## 2021-03-01 DIAGNOSIS — E78.2 MIXED HYPERLIPIDEMIA: ICD-10-CM

## 2021-03-01 DIAGNOSIS — I25.10 CORONARY ARTERY DISEASE INVOLVING NATIVE CORONARY ARTERY OF NATIVE HEART WITHOUT ANGINA PECTORIS: Primary | ICD-10-CM

## 2021-03-01 PROCEDURE — 99496 TRANSJ CARE MGMT HIGH F2F 7D: CPT | Performed by: NURSE PRACTITIONER

## 2021-03-01 RX ORDER — ATORVASTATIN CALCIUM 40 MG/1
40 TABLET, FILM COATED ORAL DAILY
Qty: 30 TABLET | Refills: 5 | Status: CANCELLED | OUTPATIENT
Start: 2021-03-01

## 2021-03-01 NOTE — PROGRESS NOTES
[facility]     Outpatient Follow Up Note    CHIEF COMPLAINT / HPI: Hospital Follow Up secondary to status post coronary angioplasty    Hospital record has been reviewed  Hospital Course progressed as follows per discharge summary:   Patient presents with    Shortness of Breath       pt with increases sob and CP on and off, worse with exertion, no known covid contacts but concerned. Patient is a 19-year-old male with diabetes and hypertension who presented with shortness of breath and chest pain. In the emergency room EKG was unremarkable cardiac enzymes were normal.  This x-ray was negative. His Covid test was negative. He was admitted and underwent a stress test which suggested inferior wall ischemia. He underwent a left heart cath by Dr. Abida Ruano and was found to have a high-grade lesion of the LAD which was stented successfully. He is on a beta-blocker, aspirin, Plavix, and a statin was discharged home in stable condition. Latanya Gandhi is 54 y.o. male who presents today for a routine follow up after a recent hospitalization related to the above mentioned issues. He recalls having trouble breathing worrying about have COVID-19  Subjective:   Since the time of discharge, the patient admits their symptoms have improved. He feels great.  hindsight, he's noticed to have more energy and not napping like he'd been. He also snores but reported that not thought to be a sleep apnea problem. He denies significant chest pain. There is no SOB/RIDDLE. The patient is not experiencing palpitations. His home BP has been running high (using wrist cuff). He has not been taking metoprolol d/t unclear inst at discharge. These symptoms are improving over the last many days. With regard to medication therapy the patient has been compliant with prescribed regimen. They have tolerated therapy to date.      Past Medical History:   Diagnosis Date    Back pain     Congenital deafness     Deaf  Hypercholesteremia     Hyperglycemia     ADA (obstructive sleep apnea)     PONV (postoperative nausea and vomiting)     Prolonged emergence from general anesthesia      Social History:    Social History     Tobacco Use   Smoking Status Current Every Day Smoker    Packs/day: 1.00    Types: Cigarettes   Smokeless Tobacco Never Used     Current Medications:  Current Outpatient Medications   Medication Sig Dispense Refill    metFORMIN (GLUCOPHAGE) 500 MG tablet Take 1 tablet by mouth daily (with breakfast) 90 tablet 1    atorvastatin (LIPITOR) 40 MG tablet Take 1 tablet by mouth daily 30 tablet 1    metoprolol tartrate (LOPRESSOR) 25 MG tablet Take 1 tablet by mouth 2 times daily 60 tablet 1    clopidogrel (PLAVIX) 75 MG tablet Take 1 tablet by mouth daily 30 tablet 1    lisinopril (PRINIVIL;ZESTRIL) 10 MG tablet TAKE ONE TABLET BY MOUTH DAILY 90 tablet 1    docusate sodium (COLACE) 100 MG capsule Take 100 mg by mouth daily       aspirin EC 81 MG EC tablet Take 1 tablet by mouth daily 30 tablet 0     No current facility-administered medications for this visit. REVIEW OF SYSTEMS:   CONSTITUTIONAL: No major weight gain or loss, fatigue, weakness, night sweats or fever. There's been no change in energy level, sleep pattern, or activity level. HEENT: No new vision difficulties or ringing in the ears. RESPIRATORY: No new SOB, PND, orthopnea or cough. CARDIOVASCULAR: See HPI  GI: No nausea, vomiting, diarrhea, constipation, abdominal pain or changes in bowel habits. : No urinary frequency, urgency, incontinence hematuria or dysuria. SKIN: No cyanosis or skin lesions. MUSCULOSKELETAL: No new muscle or joint pain. NEUROLOGICAL: No syncope or TIA-like symptoms.   PSYCHIATRIC: No anxiety, pain, insomnia or depression    Objective:   PHYSICAL EXAM:    Vitals:    03/01/21 0910 03/01/21 0933 03/01/21 0938   BP: (!) 90/58 131/64 102/62   Site: Right Upper Arm Left Wrist Left Upper Arm   Position: Sitting ~home cuff for comparison    Cuff Size: Large Adult  Large Adult   Pulse: 61     SpO2: 95%     Weight: 248 lb 6.4 oz (112.7 kg)     Height: 5' 10\" (1.778 m)           VITALS:  BP (!) 90/58 (Site: Right Upper Arm, Position: Sitting, Cuff Size: Large Adult)   Pulse 61   Ht 5' 10\" (1.778 m)   Wt 248 lb 6.4 oz (112.7 kg)   SpO2 95%   BMI 35.64 kg/m²     CONSTITUTIONAL: Cooperative, no apparent distress, and appears well nourished / developed  NEUROLOGIC:  Awake and orientated to person, place and time. PSYCH: Calm affect. SKIN: Warm and dry. HEENT: Sclera non-icteric, normocephalic, neck supple, no elevation of JVP, normal carotid pulses with no bruits and thyroid normal size. LUNGS:  No increased work of breathing and clear to auscultation, no crackles or wheezing. CARDIOVASCULAR:  Regular rate 68 and rhythm with no murmurs, gallops, rubs, or abnormal heart sounds, normal PMI. The apical impulses not displaced. Heart tones are crisp and normal                                                                                            Cervical veins are not engorged                 JVP less than 8 cm H2O                                                                              The carotid upstroke is normal in amplitude and contour without delay or bruit    ABDOMEN:  Normal bowel sounds, non-distended and non-tender to palpation   EXT: No edema, no calf tenderness. Pulses are present bilaterally.     DATA:    Lab Results   Component Value Date    ALT 41 (H) 02/19/2021    AST 32 02/19/2021    ALKPHOS 79 02/19/2021    BILITOT 1.1 (H) 02/19/2021     Lab Results   Component Value Date    CREATININE 1.1 02/21/2021    BUN 25 (H) 02/21/2021     (L) 02/21/2021    K 4.8 02/21/2021     02/21/2021    CO2 25 02/21/2021     Lab Results   Component Value Date    TSH 1.49 03/18/2013     Lab Results   Component Value Date    WBC 11.3 (H) 02/22/2021    HGB 15.4 02/22/2021 HCT 45.3 2021    MCV 93.3 2021     2021     No components found for: CHLPL  Lab Results   Component Value Date    TRIG 101 2020    TRIG 132 2019    TRIG 103 2018     Lab Results   Component Value Date    HDL 32 (L) 2020    HDL 31 (L) 2019    HDL 38 (L) 2018     Lab Results   Component Value Date    LDLCALC 60 2020    LDLCALC 40 2019    LDLCALC 48 2018     Lab Results   Component Value Date    LABVLDL 20 2020    LABVLDL 26 2019    LABVLDL 21 2018        Ref. Range 2021 22:25   SARS-CoV-2, NAAT Latest Ref Range: Not Detected  Not Detected       Radiology Review:  Pertinent images / reports were reviewed as a part of this visit and reveals the following:    Last Echo: 21:  Summary   -Technically difficult study   -Left ventricle is normal in size and function.   -Ejection fraction is visually estimated to be 60%. -There is mild concentric left ventricular hypertrophy.   -No regional wall motion abnormalities are noted. -Normal diastolic function. E/e' = 11.2.   -Trivial mitral regurgitation.   -Mild tricuspid regurgitation.   -The right ventricle is upper normal in size to mildly enlarged, function appears low normal to mildly reduced with prominent moderator band vs apical   trabeculae. Last Stress Test: 21:  Summary    Normal LVEF >60%    Normal wall motion    Small to moderate area of inferior wall ischemia     Last Angiogram: 21:  LM-short   LAD-prox 70%  Cx-mid 40%  OM- nml  RCA-dominant, mid 20%  RPDA- nml  LVEF- 60  LVG- nml  LVEDP- 10-15     FFR LAD 0.75  Invasive procedures and treatments. ~Successful PCI to LAD with 4.0x28 AMNA to 18atm. IVUS showed under-expanded stent. Post dilated with 4.5x15 NC to 18atm. Excellent Result.    ~LVG with LVEF of 60 and no regional wall motion abnormalities  ~Successful complex angioplasty and stenting of LAD    Last EC2021 demonstrates ectopic atrial rhythm at 77 beats per minute. There is T wave flattening in the inferior leads    Assessment:      Diagnosis Orders   1. Coronary artery disease involving native coronary artery of native heart without angina pectoris   ~s/p PTCA LAD ; non-obst disease of the CX & RCA  ~normal LVEF 60%   ~DAPT / statin   ~not taking metoprolol : unclear inst d/t no parameters given to take  ~RF: continues to smoke. Verbalizes importance of cessation    2. Mixed hyperlipidemia   ~HDL not at goal ; LDL at goal  ~atorvastatin increased to 40 mg daily  ~A1c 6.3% > 6.2% taking metformin and followed by PCP Lipid, Fasting    Comprehensive Metabolic Panel   3. Essential hypertension, benign   ~controlled at low normal  ~home BP cuff not comparable to in office readings nor hospital values ( 151/93 & 131/64 using a wrist cuff)  ~lisinopril 10 mg daily        Patient  is stable since hospital discharge. Plan:  Lipid profile/CMP after increasing atorvastatin    Continue to hold metoprolol at this time with low BP   F/U in 8 weeks as scheduled    I have addressed the patient's cardiac risk factors and adjusted pharmacologic treatment as needed. In addition, I have reinforced the need for patient directed risk factor modification. Further evaluation will be based upon the patient's clinical course and testing results. All questions and concerns were addressed to the patient/family. Alternatives to  treatment were discussed. The patient  currently  is smoking: < 1 ppd from > 1 ppd. The risks related to smoking were reviewed with the patient. Recommend maintaining a smoke-free lifestyle. Products available for smoking cessation were discussed. Dual Antiplatelet therapy has been recommended / prescribed for this patient. Education conducted on adverse reactions including bleeding was discussed. The patient verbalizes understanding.     Pt is on a BB : not taking   Pt is on an ace-i  Pt is on a statin      Saturated fat diet discussed : does not check BS  Exercise program discussed : plans to start walking dogs. Stays pretty busy. Declines cardiac rehab phase II    Thank you for allowing to us to participate in the care of Gladys Vasquez.       [address]  Documentation of today's visit sent to PCP

## 2021-03-01 NOTE — PATIENT INSTRUCTIONS
Cholesterol levels today since atorvastatin was increased    Start walking routinely     Continue to hold metoprolol     Keep appt in April as scheduled

## 2021-03-02 ENCOUNTER — TELEPHONE (OUTPATIENT)
Dept: CARDIOLOGY CLINIC | Age: 56
End: 2021-03-02

## 2021-03-02 ENCOUNTER — HOSPITAL ENCOUNTER (OUTPATIENT)
Age: 56
Discharge: HOME | End: 2021-03-02
Payer: MEDICARE

## 2021-03-02 DIAGNOSIS — E78.2 MIXED HYPERLIPIDEMIA: ICD-10-CM

## 2021-03-02 LAB
A/G RATIO: 1.4 (ref 1.1–2.2)
ALBUMIN SERPL-MCNC: 4.3 G/DL (ref 3.4–5)
ALP BLD-CCNC: 89 U/L (ref 40–129)
ALT SERPL-CCNC: 35 U/L (ref 10–40)
ANION GAP SERPL CALCULATED.3IONS-SCNC: 12 MMOL/L (ref 3–16)
AST SERPL-CCNC: 27 U/L (ref 15–37)
BILIRUB SERPL-MCNC: 0.5 MG/DL (ref 0–1)
BUN BLDV-MCNC: 16 MG/DL (ref 7–20)
CALCIUM SERPL-MCNC: 9.7 MG/DL (ref 8.3–10.6)
CHLORIDE BLD-SCNC: 101 MMOL/L (ref 99–110)
CHOLESTEROL, FASTING: 84 MG/DL (ref 0–199)
CO2: 27 MMOL/L (ref 21–32)
CREAT SERPL-MCNC: 1 MG/DL (ref 0.9–1.3)
GFR AFRICAN AMERICAN: >60
GFR NON-AFRICAN AMERICAN: >60
GLOBULIN: 3 G/DL
GLUCOSE BLD-MCNC: 135 MG/DL (ref 70–99)
HDLC SERPL-MCNC: 28 MG/DL (ref 40–60)
LDL CHOLESTEROL CALCULATED: 34 MG/DL
POTASSIUM SERPL-SCNC: 4.7 MMOL/L (ref 3.5–5.1)
SODIUM BLD-SCNC: 140 MMOL/L (ref 136–145)
TOTAL PROTEIN: 7.3 G/DL (ref 6.4–8.2)
TRIGLYCERIDE, FASTING: 109 MG/DL (ref 0–150)
VLDLC SERPL CALC-MCNC: 22 MG/DL

## 2021-03-02 PROCEDURE — 80053 COMPREHEN METABOLIC PANEL: CPT

## 2021-03-02 PROCEDURE — 80061 LIPID PANEL: CPT

## 2021-03-02 PROCEDURE — 36415 COLL VENOUS BLD VENIPUNCTURE: CPT

## 2021-03-02 NOTE — TELEPHONE ENCOUNTER
Called patient lmor to cb.     Hugh Dean, APRN - CNP   3/2/2021  2:38 PM EST      HDL low / glu up likely from diabetes  LDL at goal     Recheck in six months

## 2021-03-03 ENCOUNTER — CARE COORDINATION (OUTPATIENT)
Dept: CASE MANAGEMENT | Age: 56
End: 2021-03-03

## 2021-03-03 NOTE — CARE COORDINATION
[facility] Transitions Follow Up Call    3/3/2021    Patient: Mitch Lindsey  Patient : 1965   MRN: [id number]  Reason for Admission: Chest pain, s/p stent placement  Discharge Date: 21 RARS: Readmission Risk Score: 10         Spoke with: Wife \"Alexa\"    Care Transitions Subsequent and Final Call    Subsequent and Final Calls  Do you have any ongoing symptoms?: No  Have your medications changed?: No  Do you have any questions related to your medications?: No  Do you currently have any active services?: No  Do you have any needs or concerns that I can assist you with?: No  Identified Barriers: None  Care Transitions Interventions  Other Interventions: Follow Up: Final outreach call: Wife reports that he is doing well, just left to run an errand. He denies chest pain, SOB, cough, fever, and has completed follow up with cardiology. He will follow up again with cardiology in April. CTN will resolve episode and remain available.       Future Appointments   Date Time Provider Radha Downing   2021  9:00 AM TONIA Alexander - CNP [facility]   2021 11:00 AM MD JERRICA Ramsey RN

## 2021-03-16 NOTE — TELEPHONE ENCOUNTER
Pt wife calling needs a new prescriptions atorvastatin (LIPITOR) 40 MG tablet   sent to [address] [facility] on [place] [address]

## 2021-03-17 RX ORDER — ATORVASTATIN CALCIUM 40 MG/1
40 TABLET, FILM COATED ORAL DAILY
Qty: 90 TABLET | Refills: 1 | Status: SHIPPED | OUTPATIENT
Start: 2021-03-17 | End: 2021-09-23

## 2021-03-22 RX ORDER — CLOPIDOGREL BISULFATE 75 MG/1
75 TABLET ORAL DAILY
Qty: 30 TABLET | Refills: 3 | Status: SHIPPED | OUTPATIENT
Start: 2021-03-22 | End: 2021-04-26 | Stop reason: SDUPTHER

## 2021-03-22 NOTE — TELEPHONE ENCOUNTER
Medication Refill    Medication needing refilled: clopidogrel (PLAVIX) 75 MG tablet    Dosage of the medication: 75 mg    How are you taking this medication (QD, BID, TID, QID, PRN): 1 tab daily    30 or 90 day supply called in: 90    When will you run out of your medication: only has 2 days left doesn't have appt until end of April     Which Pharmacy are we sending the medication to?: [address] [facility] [address], [address] [facility] [place] [phone] [facility] [phone]   [address] [place] [address] [facility] [address]   Phone:  [phone]  Fax:  [phone]

## 2021-04-26 ENCOUNTER — OFFICE VISIT (OUTPATIENT)
Dept: CARDIOLOGY CLINIC | Age: 56
End: 2021-04-26
Payer: MEDICARE

## 2021-04-26 VITALS
HEIGHT: 70 IN | BODY MASS INDEX: 35.93 KG/M2 | SYSTOLIC BLOOD PRESSURE: 102 MMHG | OXYGEN SATURATION: 95 % | WEIGHT: 251 LBS | DIASTOLIC BLOOD PRESSURE: 52 MMHG | HEART RATE: 64 BPM

## 2021-04-26 DIAGNOSIS — I25.10 CORONARY ARTERY DISEASE INVOLVING NATIVE CORONARY ARTERY OF NATIVE HEART WITHOUT ANGINA PECTORIS: Primary | ICD-10-CM

## 2021-04-26 DIAGNOSIS — E78.2 MIXED HYPERLIPIDEMIA: ICD-10-CM

## 2021-04-26 DIAGNOSIS — I10 ESSENTIAL HYPERTENSION, BENIGN: ICD-10-CM

## 2021-04-26 PROCEDURE — 99214 OFFICE O/P EST MOD 30 MIN: CPT | Performed by: NURSE PRACTITIONER

## 2021-04-26 PROCEDURE — 3017F COLORECTAL CA SCREEN DOC REV: CPT | Performed by: NURSE PRACTITIONER

## 2021-04-26 PROCEDURE — G8427 DOCREV CUR MEDS BY ELIG CLIN: HCPCS | Performed by: NURSE PRACTITIONER

## 2021-04-26 PROCEDURE — 4004F PT TOBACCO SCREEN RCVD TLK: CPT | Performed by: NURSE PRACTITIONER

## 2021-04-26 PROCEDURE — G8417 CALC BMI ABV UP PARAM F/U: HCPCS | Performed by: NURSE PRACTITIONER

## 2021-04-26 RX ORDER — CLOPIDOGREL BISULFATE 75 MG/1
75 TABLET ORAL DAILY
Qty: 90 TABLET | Refills: 3 | Status: SHIPPED | OUTPATIENT
Start: 2021-04-26 | End: 2022-02-11

## 2021-04-26 NOTE — PATIENT INSTRUCTIONS
Plan to recheck your cholesterol levels in six months    appt with Dr. Adamaris Mayes in four monght

## 2021-04-26 NOTE — PROGRESS NOTES
[address]     Outpatient Follow Up Note    Felicie Rinne is 54 y.o. male who presents today with a history of CAD s/p PTCA LAD Feb '21, HTN and hyperlipidemia. CHIEF COMPLAINT / HPI:  Follow Up secondary to BP and medications : metoprolol not started d/t confusion    Subjective:   He denies significant chest pain. The transient pain he was having is subsiding. His energy level remains good. He still likes to take a naps. He gets up at 5 am and stays busy. There is no SOB/RIDDLE. The patient denies orthopnea/PND. The patient does not have swelling. The patients weight is stable . The patient is not experiencing palpitations or dizziness. He no longer checks his BP having doubts the wrist cuff is accurate. He'd gotten his second COVID vaccination last week (Rosina Lloyd). He felt tired afterwards and his food tasted funny. These symptoms are improving since the last OV. With regard to medication therapy the patient has been compliant with prescribed regimen. They have tolerated therapy to date.      Past Medical History:   Diagnosis Date    Back pain     Congenital deafness     Deaf     Hypercholesteremia     Hyperglycemia     ADA (obstructive sleep apnea)     PONV (postoperative nausea and vomiting)     Prolonged emergence from general anesthesia      Social History:    Social History     Tobacco Use   Smoking Status Current Every Day Smoker    Packs/day: 1.00    Types: Cigarettes   Smokeless Tobacco Never Used     Current Medications:  Current Outpatient Medications   Medication Sig Dispense Refill    clopidogrel (PLAVIX) 75 MG tablet Take 1 tablet by mouth daily 30 tablet 3    atorvastatin (LIPITOR) 40 MG tablet Take 1 tablet by mouth daily 90 tablet 1    metFORMIN (GLUCOPHAGE) 500 MG tablet Take 1 tablet by mouth daily (with breakfast) 90 tablet 1    metoprolol tartrate (LOPRESSOR) 25 MG tablet Take 1 tablet by mouth 2 times daily (Patient not taking: Reported on 3/1/2021) 60 tablet 1    lisinopril (PRINIVIL;ZESTRIL) 10 MG tablet TAKE ONE TABLET BY MOUTH DAILY 90 tablet 1    docusate sodium (COLACE) 100 MG capsule Take 100 mg by mouth daily       aspirin EC 81 MG EC tablet Take 1 tablet by mouth daily 30 tablet 0     No current facility-administered medications for this visit. REVIEW OF SYSTEMS:    CONSTITUTIONAL: No major weight gain or loss, fatigue, weakness, night sweats or fever. HEENT: No new vision difficulties or ringing in the ears. RESPIRATORY: No new SOB, PND, orthopnea or cough. CARDIOVASCULAR: See HPI  GI: No nausea, vomiting, diarrhea, constipation, abdominal pain or changes in bowel habits. : No urinary frequency, urgency, incontinence hematuria or dysuria. SKIN: No cyanosis or skin lesions. MUSCULOSKELETAL: No new muscle or joint pain. NEUROLOGICAL: No syncope or TIA-like symptoms. PSYCHIATRIC: No anxiety, pain, insomnia or depression    Objective:   PHYSICAL EXAM:        Vitals:    04/26/21 0904 04/26/21 0922   BP: 102/60 (!) 102/52   Site: Right Upper Arm Left Upper Arm   Position: Sitting    Cuff Size: Large Adult Large Adult   Pulse: 64    SpO2: 95%    Weight: 251 lb (113.9 kg)    Height: 5' 10\" (1.778 m)        VITALS:  BP (!) 102/52 (Site: Left Upper Arm, Cuff Size: Large Adult)   Pulse 64   Ht 5' 10\" (1.778 m)   Wt 251 lb (113.9 kg)   SpO2 95%   BMI 36.01 kg/m²   CONSTITUTIONAL: Cooperative, no apparent distress, and appears well nourished / developed  NEUROLOGIC:  Awake and orientated to person, place and time. PSYCH: Calm affect. SKIN: Warm and dry. HEENT: Sclera non-icteric, normocephalic, neck supple, no elevation of JVP, normal carotid pulses with no bruits and thyroid normal size. LUNGS:  No increased work of breathing and clear to auscultation, no crackles or wheezing  CARDIOVASCULAR:  Regular rate 64 and rhythm with no murmurs, gallops, rubs, or abnormal heart sounds, normal PMI. The apical impulses not displaced  JVP less than 8 cm H2O  Heart tones are crisp and normal  Cervical veins are not engorged  The carotid upstroke is normal in amplitude and contour without delay or bruit  JVP is not elevated  ABDOMEN:  Normal bowel sounds, non-distended and non-tender to palpation  EXT: No edema, no calf tenderness. Pulses are present bilaterally. DATA:    Lab Results   Component Value Date    ALT 35 03/02/2021    AST 27 03/02/2021    ALKPHOS 89 03/02/2021    BILITOT 0.5 03/02/2021     Lab Results   Component Value Date    CREATININE 1.0 03/02/2021    BUN 16 03/02/2021     03/02/2021    K 4.7 03/02/2021     03/02/2021    CO2 27 03/02/2021     Lab Results   Component Value Date    TSH 1.49 03/18/2013     Lab Results   Component Value Date    WBC 11.3 (H) 02/22/2021    HGB 15.4 02/22/2021    HCT 45.3 02/22/2021    MCV 93.3 02/22/2021     02/22/2021     No components found for: CHLPL  Lab Results   Component Value Date    TRIG 101 07/14/2020    TRIG 132 07/02/2019    TRIG 103 06/12/2018     Lab Results   Component Value Date    HDL 28 (L) 03/02/2021    HDL 32 (L) 07/14/2020    HDL 31 (L) 07/02/2019     Lab Results   Component Value Date    LDLCALC 34 03/02/2021    LDLCALC 60 07/14/2020    LDLCALC 40 07/02/2019     Lab Results   Component Value Date    LABVLDL 22 03/02/2021    LABVLDL 20 07/14/2020    LABVLDL 26 07/02/2019     Radiology Review:  Pertinent images / reports were reviewed as a part of this visit and reveals the following:    Last Echo: 2/20/21:  Summary   -Technically difficult study   -Left ventricle is normal in size and function.   -Ejection fraction is visually estimated to be 60%.  -There is mild concentric left ventricular hypertrophy.   -No regional wall motion abnormalities are noted.   -Normal diastolic function.  E/e' = 11.2.   -Trivial mitral regurgitation.   -Mild tricuspid regurgitation.   -The right ventricle is upper normal in size to mildly enlarged, function appears low normal to mildly reduced with prominent moderator band vs apical   trabeculae.     Last Stress Test: 21:  Summary    Normal LVEF >60%    Normal wall motion    Small to moderate area of inferior wall ischemia      Last Angiogram: 21:  LM-short   LAD-prox 70%  Cx-mid 40%  OM- nml  RCA-dominant, mid 20%  RPDA- nml  LVEF- 60  LVG- nml  LVEDP- 10-15     FFR LAD 0.75  Invasive procedures and treatments.   ~Successful PCI to LAD with 4.0x28 AMNA to 18atm. IVUS showed under-expanded stent. Post dilated with 4.5x15 NC to 18atm. Excellent Result. ~LVG with LVEF of 60 and no regional wall motion abnormalities  ~Successful complex angioplasty and stenting of LAD     Last EC2021 demonstrates ectopic atrial rhythm at 77 beats per minute.  There is T wave flattening in the inferior leads      Assessment:      Diagnosis Orders   1. Coronary artery disease involving native coronary artery of native heart without angina pectoris   ~stable : offers no c/o angina  ~tolerating activities : out mowing lawn  ~DAPT / statin. BB not started with HR & BP  ~s/p PTCA LAD   ~offers no c/o palpitations : atrial rhythm noted on EKG from 21. Not taking metoprolol d/t low HR and BP    2. Essential hypertension, benign   ~well controlled to low normal  ~lisinopril     3. Mixed hyperlipidemia   ~LDL improved : tolerating atorvastatin           I had the opportunity to review the clinical symptoms and presentation of Phylicia Clement. Plan:     1. Plan to recheck lipids in six months  2. F/U in 4 months with Dr. Gauri Singh    Overall the patient is stable from CV standpoint    I have addresed the patient's cardiac risk factors and adjusted pharmacologic treatment as needed. In addition, I have reinforced the need for patient directed risk factor modification. Further evaluation will be based upon the patient's clinical course and testing results. All questions and concerns were addressed to the patient/family.  Alternatives to my treatment were discussed. The patient is currently smoking: ~ 1/2 ppd and trying to cut back. The risks related to smoking were reviewed with the patient. Recommend maintaining a smoke-free lifestyle. Products available for smoking cessation were discussed     Patient is not on a beta-blocker : BP and HR does not support  Patient is on an ace-i/ARB  Patient is on a statin    Dual Antiplatelet therapy has been recommended / prescribed for this patient. Education conducted on adverse reactions including bleeding was discussed. The patient verbalizes understanding not to stop medications without discussing with us. Discussed exercise: 30-60 minutes 7 days/week : cutting grass and tolerating  Discussed Low saturated fat/VIENET diet. Thank you for allowing to us to participate in the care of Jd Perla.     TONIA Stewart    Documentation of today's visit sent to PCP

## 2021-07-19 ENCOUNTER — OFFICE VISIT (OUTPATIENT)
Dept: INTERNAL MEDICINE CLINIC | Age: 56
End: 2021-07-19
Payer: MEDICARE

## 2021-07-19 VITALS
WEIGHT: 245 LBS | DIASTOLIC BLOOD PRESSURE: 70 MMHG | HEART RATE: 64 BPM | BODY MASS INDEX: 35.07 KG/M2 | HEIGHT: 70 IN | SYSTOLIC BLOOD PRESSURE: 128 MMHG

## 2021-07-19 DIAGNOSIS — R73.09 ABNORMAL GLUCOSE: Primary | ICD-10-CM

## 2021-07-19 DIAGNOSIS — B37.2 CANDIDIASIS OF SKIN: ICD-10-CM

## 2021-07-19 DIAGNOSIS — R23.3 EASY BRUISING: ICD-10-CM

## 2021-07-19 DIAGNOSIS — E78.2 MIXED HYPERLIPIDEMIA: ICD-10-CM

## 2021-07-19 DIAGNOSIS — I10 ESSENTIAL HYPERTENSION, BENIGN: ICD-10-CM

## 2021-07-19 PROBLEM — F33.8 SEASONAL AFFECTIVE DISORDER (HCC): Status: RESOLVED | Noted: 2021-01-19 | Resolved: 2021-07-19

## 2021-07-19 PROBLEM — I20.0 UNSTABLE ANGINA (HCC): Status: RESOLVED | Noted: 2021-02-20 | Resolved: 2021-07-19

## 2021-07-19 PROBLEM — R07.9 CHEST PAIN: Status: RESOLVED | Noted: 2021-02-19 | Resolved: 2021-07-19

## 2021-07-19 LAB — HBA1C MFR BLD: 5.8 %

## 2021-07-19 PROCEDURE — 4004F PT TOBACCO SCREEN RCVD TLK: CPT | Performed by: INTERNAL MEDICINE

## 2021-07-19 PROCEDURE — 83036 HEMOGLOBIN GLYCOSYLATED A1C: CPT | Performed by: INTERNAL MEDICINE

## 2021-07-19 PROCEDURE — G8427 DOCREV CUR MEDS BY ELIG CLIN: HCPCS | Performed by: INTERNAL MEDICINE

## 2021-07-19 PROCEDURE — G8417 CALC BMI ABV UP PARAM F/U: HCPCS | Performed by: INTERNAL MEDICINE

## 2021-07-19 PROCEDURE — 3017F COLORECTAL CA SCREEN DOC REV: CPT | Performed by: INTERNAL MEDICINE

## 2021-07-19 PROCEDURE — 99214 OFFICE O/P EST MOD 30 MIN: CPT | Performed by: INTERNAL MEDICINE

## 2021-07-19 RX ORDER — LISINOPRIL 10 MG/1
10 TABLET ORAL DAILY
Qty: 90 TABLET | Refills: 3 | Status: SHIPPED | OUTPATIENT
Start: 2021-07-19 | End: 2022-02-11

## 2021-07-19 SDOH — ECONOMIC STABILITY: FOOD INSECURITY: WITHIN THE PAST 12 MONTHS, YOU WORRIED THAT YOUR FOOD WOULD RUN OUT BEFORE YOU GOT MONEY TO BUY MORE.: NEVER TRUE

## 2021-07-19 SDOH — ECONOMIC STABILITY: FOOD INSECURITY: WITHIN THE PAST 12 MONTHS, THE FOOD YOU BOUGHT JUST DIDN'T LAST AND YOU DIDN'T HAVE MONEY TO GET MORE.: NEVER TRUE

## 2021-07-19 ASSESSMENT — SOCIAL DETERMINANTS OF HEALTH (SDOH): HOW HARD IS IT FOR YOU TO PAY FOR THE VERY BASICS LIKE FOOD, HOUSING, MEDICAL CARE, AND HEATING?: NOT HARD AT ALL

## 2021-07-19 NOTE — PROGRESS NOTES
Chief Complaint   Patient presents with    Hypertension    Hyperlipidemia    Blood Sugar Problem     abnl glucose     Bleeding/Bruising     pt reports getting a lot of bleeding when bumping into things      Rash     Pt has a rash under both arms worse on rt. when using clotimazole cream it does take it away        HPI:  Leah Scruggs is a 64 y.o. (: 1965) here today   for management of Abnormal glucose, hypertension, and hyperlipidemia    Abnl glucose: He is taking metformin 500 mg daily as directed. He reports adherence to lifestyle recommendations, specifically healthy diet and regular activity. Since his last visit, he has lost weight. HTN: The patient is tolerating blood pressure medication well and taking them as directed. BP control outside of the office is reported as not monitored. No symptoms concerning for end organ damage are present. HLD: He is taking atorvastatin daily as directed. He denies any side effects and tolerates well.           PFSH:10 cigs/day             ROS:  CV: Neg for chest pain  RESP: neg for dyspnea    Skin: Rash under armpits bilaterally       OBJECTIVE:    /70   Pulse 64   Ht 5' 10\" (1.778 m)   Wt 245 lb (111.1 kg)   BMI 35.15 kg/m²     Wt Readings from Last 3 Encounters:   21 245 lb (111.1 kg)   21 251 lb (113.9 kg)   21 248 lb 6.4 oz (112.7 kg)       GEN: WN/WD, NAD  CV: regular rate and rhythm, no murmurs rubs or gallops  Resp: normal effort, clear auscultation bilaterally  No peripheral edema   Skin: red patch in bilateral axillae          Lab Results   Component Value Date    CREATININE 1.0 2021    BUN 16 2021     2021    K 4.7 2021     2021    CO2 27 2021         Lab Results   Component Value Date    LABA1C 6.2 2021        Lab Results   Component Value Date    CHOL 112 2020    CHOL 97 2019    CHOL 107 2018     Lab Results   Component Value Date    TRIG 101 07/14/2020    TRIG 132 07/02/2019    TRIG 103 06/12/2018     Lab Results   Component Value Date    HDL 28 (L) 03/02/2021    HDL 32 (L) 07/14/2020    HDL 31 (L) 07/02/2019     Lab Results   Component Value Date    LDLCALC 34 03/02/2021    LDLCALC 60 07/14/2020    LDLCALC 40 07/02/2019     Lab Results   Component Value Date    LABVLDL 22 03/02/2021    LABVLDL 20 07/14/2020    LABVLDL 26 07/02/2019     No results found for: CHOLHDLRATIO             ASSESSMENT/PLAN:    1. Abnormal glucose  Chronic, stable  Continue healthy lifestyle    2. Essential hypertension, benign  Well controlled  The current medical regimen is effective;  continue present plan and medications. 3. Mixed hyperlipidemia  Chronic, stable  The current medical regimen is effective;  continue present plan and medications. 4. Easy bleeding  Probably due to ASA and Plavix  Will check CBC    5. Candidiasis of axillae:  Improved with clotrimazole. We discussed the need to keep this area dry. He is going to switch from a deodorant to antiperspirant. 6. Advised of need for colonoscopy. He will contact [facility] to schedule.      RTO 6 months

## 2021-07-19 NOTE — PATIENT INSTRUCTIONS
Call [phone] to schedule your colonoscopy    Please send a [employer] message or call the office with any questions or concerns #[phone]

## 2021-07-20 LAB
BASOPHILS ABSOLUTE: 0.1 K/UL (ref 0–0.2)
BASOPHILS RELATIVE PERCENT: 0.7 %
EOSINOPHILS ABSOLUTE: 0.4 K/UL (ref 0–0.6)
EOSINOPHILS RELATIVE PERCENT: 2.9 %
HCT VFR BLD CALC: 43.5 % (ref 40.5–52.5)
HEMOGLOBIN: 14.6 G/DL (ref 13.5–17.5)
LYMPHOCYTES ABSOLUTE: 3.9 K/UL (ref 1–5.1)
LYMPHOCYTES RELATIVE PERCENT: 28.4 %
MCH RBC QN AUTO: 32.2 PG (ref 26–34)
MCHC RBC AUTO-ENTMCNC: 33.6 G/DL (ref 31–36)
MCV RBC AUTO: 96 FL (ref 80–100)
MONOCYTES ABSOLUTE: 0.8 K/UL (ref 0–1.3)
MONOCYTES RELATIVE PERCENT: 5.9 %
NEUTROPHILS ABSOLUTE: 8.5 K/UL (ref 1.7–7.7)
NEUTROPHILS RELATIVE PERCENT: 62.1 %
PDW BLD-RTO: 13.1 % (ref 12.4–15.4)
PLATELET # BLD: 265 K/UL (ref 135–450)
PMV BLD AUTO: 10.4 FL (ref 5–10.5)
RBC # BLD: 4.54 M/UL (ref 4.2–5.9)
WBC # BLD: 13.8 K/UL (ref 4–11)

## 2021-08-16 NOTE — PROGRESS NOTES
[address]   Cardiac Evaluation      Patient: Leah Scruggs  YOB: 1965         Chief Complaint   Patient presents with    Hypertension     Patient return to office for his 4 month follow up    Coronary Artery Disease    Hyperlipidemia        Referring provider: Pricilla Barlow MD    History of Present Illness:   Leah Scruggs is a 64 y.o. male here for hospital follow up for CAD. He also has Htn, Hld, DM, tobacco abuse. He is legally deaf, but able to read lips. He presented to [facility] ER 2/20/21 with chest discomfort. He had shoveled snow a few days before and had associated fatigue. He had normal troponins, abnormal stress test and was placed on heparin gtt until he was taken to the cath lab the next day where he had PCI to LAD with AMNA. Today he reports that he is doing well. Denies chest pain or dyspnea. He has made changes to his diet and lost some weight. He is able to mow the lawn without difficulties. With regard to medication therapy he/she has been compliant with prescribed regimen and has tolerated therapy to date. Past Medical History:   has a past medical history of Back pain, Congenital deafness, Deaf, Hypercholesteremia, Hyperglycemia, ADA (obstructive sleep apnea), PONV (postoperative nausea and vomiting), and Prolonged emergence from general anesthesia. Surgical History:   has a past surgical history that includes hernia repair (1994); Kidney stone surgery; and Inguinal hernia repair (7/1/15).      Current Outpatient Medications   Medication Sig Dispense Refill    metFORMIN (GLUCOPHAGE) 500 MG tablet Take 1 tablet by mouth daily (with breakfast) 90 tablet 3    lisinopril (PRINIVIL;ZESTRIL) 10 MG tablet Take 1 tablet by mouth daily 90 tablet 3    clopidogrel (PLAVIX) 75 MG tablet Take 1 tablet by mouth daily 90 tablet 3    atorvastatin (LIPITOR) 40 MG tablet Take 1 tablet by mouth daily 90 tablet 1    docusate sodium (COLACE) 100 MG capsule Diabetes Mother     Kidney Disease Father         39 y/o  of kidney disease     Family history has been reviewed and not pertinent except as noted above. Review of Systems:   · Constitutional: there has been no unanticipated weight loss. No change in energy or activity level   · Eyes: No visual changes   · ENT: No Headaches, hearing loss or vertigo. No mouth sores or sore throat. · Cardiovascular: Reviewed in HPI  · Respiratory: No cough or wheezing, no sputum production. · Gastrointestinal: No abdominal pain, appetite loss, blood in stools. No change in bowel or bladder habits. · Genitourinary: No nocturia, dysuria, trouble voiding  · Musculoskeletal:  No gait disturbance, weakness or joint complaints. · Integumentary: No rash or pruritis. · Neurological: No headache, change in muscle strength, numbness or tingling. No change in gait, balance, coordination, mood, affect, memory, mentation, behavior. · Psychiatric: No anxiety or depression  · Endocrine: No malaise or fever  · Hematologic/Lymphatic: No abnormal bruising or bleeding, blood clots or swollen lymph nodes. · Allergic/Immunologic: No nasal congestion or hives. Physical Examination:    Vitals:    21 1306   BP: 120/60   Site: Right Upper Arm   Position: Sitting   Cuff Size: Large Adult   Pulse: 71   Resp: 17   SpO2: 93%   Weight: 241 lb 6.4 oz (109.5 kg)   Height: 5' 10\" (1.778 m)     Body mass index is 34.64 kg/m². Wt Readings from Last 3 Encounters:   21 241 lb 6.4 oz (109.5 kg)   21 245 lb (111.1 kg)   21 251 lb (113.9 kg)      BP Readings from Last 3 Encounters:   21 120/60   21 128/70   21 (!) 102/52        Physical Examination:    · CONSTITUTIONAL: Well developed, well nourished  · EYES: PERRLA. No xanthelasma, sclera non icteric  · EARS,NOSE,MOUTH,THROAT:  Mucous membranes moist, normal hearing  · NECK: Supple, JVP normal, thyroid not enlarged.  Carotids 2+ without bruits  · RESPIRATORY: Normal effort, no rales or rhonchi  · CARDIOVASCULAR: Normal PMI, regular rate and rhythm, no murmurs, rub or gallop. No edema. Radial pulses present and equal  · CHEST: No scar or masses  · ABDOMEN: Normal bowel sounds. No masses or tenderness. No bruit  · MUSCULOSKELETAL: No clubbing or cyanosis. Moves all extremities well. Normal gait  · SKIN:  Warm and dry. No rashes  · NEUROLOGIC: Cranial nerves intact. Alert and oriented  · PSYCHIATRIC: Calm affect. Appears to have normal judgement and insight    All testing and labs listed below were personally reviewed by myself. Cardiac Cath PCI, FFR, IVUS: 2/22/21  Anatomy:   LM-short   LAD-prox 70%  Cx-mid 40%  OM- nml  RCA-dominant, mid 20%  RPDA- nml  LVEF- 60  LVG- nml  LVEDP- 10-15  FFR LAD 0.75  Intervention  ~Successful PCI to LAD with 4.0x28 AMNA to 18atm. IVUS showed under-expanded stent. Post dilated with 4.5x15 NC to 18atm. Excellent Result. Contrast: 13  Flouro Time: 21.5  Access: R radial a     Impression  ~Coronary Angiography w/ severe single vessel CAD  ~LVG with LVEF of 60 and no regional wall motion abnormalities  ~Successful complex angioplasty and stenting of LAD  ~ recommend  aspirin and plavix for 6-12 months    Stress 2/19/21  Summary  Normal LVEF >60%  Normal wall motion  Small to moderate area of inferior wall ischemia   Overall, this would be considered an abnormal, intermediate risk, study     Echo 2/19/21  Summary  Technically difficult study  Left ventricle is normal in size and function. Ejection fraction is visually estimated to be 60%. There is mild concentric left ventricular hypertrophy. No regional wall motion abnormalities are noted. Normal diastolic function. E/e' = 11.2. Trivial mitral regurgitation. Mild tricuspid regurgitation. The right ventricle is upper normal in size to mildly enlarged, function   appears low normal to mildly reduced with prominent moderator band vs apical   trabeculae. Assessment/Plan  1. Coronary artery disease involving native coronary artery of native heart without angina pectoris    2. Essential hypertension    3. Hyperlipidemia, unspecified hyperlipidemia type          Coronary artery disease involving native coronary artery of native heart without angina pectoris   Angina~ none  CCS class 1  Intervention  Stress~ 2/2021 inferior wall ischemia  LHC~ 2/2021 PCI to LAD with AMNA  Echo~ 60%  Current meds~ asa / plavix  Plan~ will discuss stopping plavix  at next visit  DAPT reviewed no significant bleeding issues      Essential hypertension  /60 (Site: Right Upper Arm, Position: Sitting, Cuff Size: Large Adult)   Pulse 71   Resp 17   Ht 5' 10\" (1.778 m)   Wt 241 lb 6.4 oz (109.5 kg)   SpO2 93%   BMI 34.64 kg/m²   Meds~ lisinopril         Hyperlipidemia  Lab Results   Component Value Date    CHOL 112 07/14/2020    TRIG 101 07/14/2020    HDL 28 03/02/2021    HDL 34 09/07/2010    LDLCALC 34 03/02/2021     Meds~ lipitor  Plan~ continue with         Orders Placed This Encounter   Procedures    LIPID PANEL       Follow up in 6    Yaron Mckeon MD      Thank you for allowing to me to participate in the care of Nargis Manriquez. Scribe's Attestation: This note was scribed in the presence of Dr. Josey Oviedo MD by Nabeel Almazan RN. 08/24/21    I, Dr. Josey Oviedo, personally performed the services described in this documentation, as scribed by the above signed scribe in my presence. It is both accurate and complete to my knowledge. I agree with the details independently gathered by the clinical support staff, while the remaining scribed note accurately describes my personal service to the patient.

## 2021-08-18 PROBLEM — I25.10 CORONARY ARTERY DISEASE INVOLVING NATIVE CORONARY ARTERY OF NATIVE HEART WITHOUT ANGINA PECTORIS: Status: ACTIVE | Noted: 2021-08-18

## 2021-08-18 PROBLEM — E78.5 HYPERLIPIDEMIA: Status: ACTIVE | Noted: 2017-12-12

## 2021-08-18 NOTE — ASSESSMENT & PLAN NOTE
/60 (Site: Right Upper Arm, Position: Sitting, Cuff Size: Large Adult)   Pulse 71   Resp 17   Ht 5' 10\" (1.778 m)   Wt 241 lb 6.4 oz (109.5 kg)   SpO2 93%   BMI 34.64 kg/m²   Meds~ lisinopril

## 2021-08-18 NOTE — PATIENT INSTRUCTIONS
We can discuss stopping one of the blood thinners at your next visit  Have your cholesterol checked again in the next couple of months  Follow up in 6 months

## 2021-08-18 NOTE — ASSESSMENT & PLAN NOTE
Lab Results   Component Value Date    CHOL 112 07/14/2020    TRIG 101 07/14/2020    HDL 28 03/02/2021    HDL 34 09/07/2010    LDLCALC 34 03/02/2021     Meds~ lipitor  Plan~ continue with

## 2021-08-18 NOTE — ASSESSMENT & PLAN NOTE
Angina~ none  CCS class 1  Intervention  Stress~ 2/2021 inferior wall ischemia  LHC~ 2/2021 PCI to LAD with AMNA  Echo~ 60%  Current meds~ asa / plavix  Plan~ will discuss stopping plavix  at next visit  DAPT reviewed no significant bleeding issues

## 2021-08-24 ENCOUNTER — OFFICE VISIT (OUTPATIENT)
Dept: CARDIOLOGY CLINIC | Age: 56
End: 2021-08-24
Payer: MEDICARE

## 2021-08-24 VITALS
HEART RATE: 71 BPM | OXYGEN SATURATION: 93 % | RESPIRATION RATE: 17 BRPM | WEIGHT: 241.4 LBS | BODY MASS INDEX: 34.56 KG/M2 | HEIGHT: 70 IN | DIASTOLIC BLOOD PRESSURE: 60 MMHG | SYSTOLIC BLOOD PRESSURE: 120 MMHG

## 2021-08-24 DIAGNOSIS — I10 ESSENTIAL HYPERTENSION: ICD-10-CM

## 2021-08-24 DIAGNOSIS — E78.5 HYPERLIPIDEMIA, UNSPECIFIED HYPERLIPIDEMIA TYPE: ICD-10-CM

## 2021-08-24 DIAGNOSIS — I25.10 CORONARY ARTERY DISEASE INVOLVING NATIVE CORONARY ARTERY OF NATIVE HEART WITHOUT ANGINA PECTORIS: Primary | ICD-10-CM

## 2021-08-24 PROCEDURE — G8417 CALC BMI ABV UP PARAM F/U: HCPCS | Performed by: INTERNAL MEDICINE

## 2021-08-24 PROCEDURE — 99214 OFFICE O/P EST MOD 30 MIN: CPT | Performed by: INTERNAL MEDICINE

## 2021-08-24 PROCEDURE — 4004F PT TOBACCO SCREEN RCVD TLK: CPT | Performed by: INTERNAL MEDICINE

## 2021-08-24 PROCEDURE — G8427 DOCREV CUR MEDS BY ELIG CLIN: HCPCS | Performed by: INTERNAL MEDICINE

## 2021-08-24 PROCEDURE — 3017F COLORECTAL CA SCREEN DOC REV: CPT | Performed by: INTERNAL MEDICINE

## 2021-09-23 RX ORDER — ATORVASTATIN CALCIUM 40 MG/1
TABLET, FILM COATED ORAL
Qty: 90 TABLET | Refills: 1 | Status: SHIPPED | OUTPATIENT
Start: 2021-09-23 | End: 2022-03-29

## 2021-11-04 ENCOUNTER — NURSE ONLY (OUTPATIENT)
Dept: INTERNAL MEDICINE CLINIC | Age: 56
End: 2021-11-04
Payer: MEDICARE

## 2021-11-04 DIAGNOSIS — Z23 NEED FOR INFLUENZA VACCINATION: Primary | ICD-10-CM

## 2021-11-04 PROCEDURE — 90674 CCIIV4 VAC NO PRSV 0.5 ML IM: CPT | Performed by: INTERNAL MEDICINE

## 2021-11-04 PROCEDURE — G0008 ADMIN INFLUENZA VIRUS VAC: HCPCS | Performed by: INTERNAL MEDICINE

## 2022-01-18 ENCOUNTER — OFFICE VISIT (OUTPATIENT)
Dept: INTERNAL MEDICINE CLINIC | Age: 57
End: 2022-01-18
Payer: MEDICARE

## 2022-01-18 VITALS
BODY MASS INDEX: 35.88 KG/M2 | HEIGHT: 70 IN | WEIGHT: 250.6 LBS | HEART RATE: 84 BPM | SYSTOLIC BLOOD PRESSURE: 122 MMHG | DIASTOLIC BLOOD PRESSURE: 82 MMHG

## 2022-01-18 DIAGNOSIS — E78.5 HYPERLIPIDEMIA, UNSPECIFIED HYPERLIPIDEMIA TYPE: ICD-10-CM

## 2022-01-18 DIAGNOSIS — E78.2 MIXED HYPERLIPIDEMIA: ICD-10-CM

## 2022-01-18 DIAGNOSIS — R40.20 LOC (LOSS OF CONSCIOUSNESS) (HCC): ICD-10-CM

## 2022-01-18 DIAGNOSIS — R73.09 ABNORMAL GLUCOSE: Primary | ICD-10-CM

## 2022-01-18 DIAGNOSIS — I10 ESSENTIAL HYPERTENSION: ICD-10-CM

## 2022-01-18 LAB
CHOLESTEROL, TOTAL: 100 MG/DL (ref 0–199)
HBA1C MFR BLD: 6.1 %
HDLC SERPL-MCNC: 35 MG/DL (ref 40–60)
LDL CHOLESTEROL CALCULATED: 47 MG/DL
TRIGL SERPL-MCNC: 91 MG/DL (ref 0–150)
VLDLC SERPL CALC-MCNC: 18 MG/DL

## 2022-01-18 PROCEDURE — 4004F PT TOBACCO SCREEN RCVD TLK: CPT | Performed by: INTERNAL MEDICINE

## 2022-01-18 PROCEDURE — G8427 DOCREV CUR MEDS BY ELIG CLIN: HCPCS | Performed by: INTERNAL MEDICINE

## 2022-01-18 PROCEDURE — 99214 OFFICE O/P EST MOD 30 MIN: CPT | Performed by: INTERNAL MEDICINE

## 2022-01-18 PROCEDURE — 83036 HEMOGLOBIN GLYCOSYLATED A1C: CPT | Performed by: INTERNAL MEDICINE

## 2022-01-18 PROCEDURE — G8417 CALC BMI ABV UP PARAM F/U: HCPCS | Performed by: INTERNAL MEDICINE

## 2022-01-18 PROCEDURE — 3017F COLORECTAL CA SCREEN DOC REV: CPT | Performed by: INTERNAL MEDICINE

## 2022-01-18 PROCEDURE — G8482 FLU IMMUNIZE ORDER/ADMIN: HCPCS | Performed by: INTERNAL MEDICINE

## 2022-01-18 NOTE — PROGRESS NOTES
Chief Complaint   Patient presents with    Hypertension    Hyperlipidemia    Other     abnl glucose     Loss of Consciousness     Pt had an episode about 1 mo ago. he had some dizziness and felt himself falling and passed out. not sure how long. HPI:  Elkin Stinson is a 64 y.o. (: 1965) here today   for management of Abnormal glucose, hypertension, and hyperlipidemia    Abnl glucose: He is taking metformin 500 mg daily as directed. He reports adherence to lifestyle recommendations, specifically healthy diet and regular activity. Since his last visit, he has lost weight. HTN: The patient is tolerating blood pressure medication well and taking them as directed. BP control outside of the office is reported as not monitored. No symptoms concerning for end organ damage are present. HLD: He is taking atorvastatin daily as directed. He denies any side effects and tolerates well. About a month ago, he had a syncopal episode. Initially, he felt light headed. He was unconscious for a couple of seconds. He immediately regained orientation. He denies injury. He has had one prior episode several years ago.   He was able to shovel two driveways yesterday without symptoms or limitations    Had cath and PCI 21  Echo 21 nl LVEF, no AS         PFSH:10 cigs/day             ROS:  CV: Neg for chest pain  RESP: neg for dyspnea          OBJECTIVE:    /82   Pulse 84   Ht 5' 10\" (1.778 m)   Wt 250 lb 9.6 oz (113.7 kg)   BMI 35.96 kg/m²     Wt Readings from Last 3 Encounters:   22 250 lb 9.6 oz (113.7 kg)   21 241 lb 6.4 oz (109.5 kg)   21 245 lb (111.1 kg)       GEN: WN/WD, NAD  CV: regular rate and rhythm, no murmurs rubs or gallops  Resp: normal effort, clear auscultation bilaterally  No peripheral edema   NEURO: CN intact, no focal motor deficits            Lab Results   Component Value Date    CREATININE 1.0 2021    BUN 16 2021     2021 K 4.7 03/02/2021     03/02/2021    CO2 27 03/02/2021         Lab Results   Component Value Date    LABA1C 5.8 07/19/2021        Lab Results   Component Value Date    CHOL 112 07/14/2020    CHOL 97 07/02/2019    CHOL 107 06/12/2018     Lab Results   Component Value Date    TRIG 101 07/14/2020    TRIG 132 07/02/2019    TRIG 103 06/12/2018     Lab Results   Component Value Date    HDL 28 (L) 03/02/2021    HDL 32 (L) 07/14/2020    HDL 31 (L) 07/02/2019     Lab Results   Component Value Date    LDLCALC 34 03/02/2021    LDLCALC 60 07/14/2020    LDLCALC 40 07/02/2019     Lab Results   Component Value Date    LABVLDL 22 03/02/2021    LABVLDL 20 07/14/2020    LABVLDL 26 07/02/2019     No results found for: CHOLHDLRATIO             ASSESSMENT/PLAN:  1. Abnormal glucose  Chronic and stable. At increased risk for diabetes. Continue metformin. Healthy diet and regular exercise and weight management advised. - POCT glycosylated hemoglobin (Hb A1C)    2. Essential hypertension  Chronic and well controlled. Blood work is up-to-date. Continue lisinopril. 3. Mixed hyperlipidemia  Chronic and stable. Continue atorvastatin. 4. LOC (loss of consciousness) ([address]  Symptoms most consistent with vasovagal syncope. Has had extensive cardiac testing within the past year. He has follow-up with cardiology scheduled on February 11. Based on his symptoms, exam, and prior cardiac testing additional testing will be deferred. He will also discuss symptoms with his cardiologist at his upcoming appointment.           RTO 6 months

## 2022-01-28 NOTE — ASSESSMENT & PLAN NOTE
/68   Pulse 66   Ht 5' 10\" (1.778 m)   Wt 253 lb (114.8 kg)   SpO2 95%   BMI 36.30 kg/m²   Meds~ lisinopril   Plan~ decrease lisinopril from 10 mg to 5 mg

## 2022-01-28 NOTE — ASSESSMENT & PLAN NOTE
Angina~ none  CCS class 1  Intervention  Stress~ 2/2021 inferior wall ischemia  LHC~ 2/2021 PCI to LAD with AMNA  Echo~ 60%  Current meds~ asa / plavix  Plan~ stop plavix, continue aspirin

## 2022-01-28 NOTE — PROGRESS NOTES
[address]   Cardiac Evaluation      Patient: Rohit Garcia  YOB: 1965         Chief Complaint   Patient presents with    Coronary Artery Disease    Hyperlipidemia    Follow-up     no cardio symptoms        Referring provider: Eric Elaine MD    History of Present Illness:   Rohit Garcia is a 64 y.o. male here for hospital follow up for CAD. He also has Htn, Hld, DM, tobacco abuse. He is legally deaf, but able to read lips. He presented to [facility] ER 2/20/21 with chest discomfort. He had shoveled snow a few days before and had associated fatigue. He had normal troponins, abnormal stress test and was placed on heparin gtt until he was taken to the cath lab the next day where he had PCI to LAD with AMNA. Today he is here with his wife. She helps sign for him with his hearing loss. He denies chest pain and SOB. She reports that he was shoveling snow recently and he tolerated that well. Has some arm pain, but thinks it is how he sleeps. With regard to medication therapy he/she has been compliant with prescribed regimen and has tolerated therapy to date. Past Medical History:   has a past medical history of Back pain, Congenital deafness, Deaf, Hypercholesteremia, Hyperglycemia, ADA (obstructive sleep apnea), PONV (postoperative nausea and vomiting), and Prolonged emergence from general anesthesia. Surgical History:   has a past surgical history that includes hernia repair (1994); Kidney stone surgery; and Inguinal hernia repair (7/1/15).      Current Outpatient Medications   Medication Sig Dispense Refill    lisinopril (PRINIVIL;ZESTRIL) 5 MG tablet Take 1 tablet by mouth daily 90 tablet 3    atorvastatin (LIPITOR) 40 MG tablet TAKE ONE TABLET BY MOUTH DAILY 90 tablet 1    metFORMIN (GLUCOPHAGE) 500 MG tablet Take 1 tablet by mouth daily (with breakfast) 90 tablet 3    docusate sodium (COLACE) 100 MG capsule Take 100 mg by mouth daily       aspirin EC 81 MG EC tablet Take 1 tablet by mouth daily 30 tablet 0     No current facility-administered medications for this visit. Allergies:  Tylenol [acetaminophen]     Social History:  Social History     Socioeconomic History    Marital status:      Spouse name: Not on file    Number of children: Not on file    Years of education: Not on file    Highest education level: Not on file   Occupational History    Not on file   Tobacco Use    Smoking status: Current Every Day Smoker     Packs/day: 0.25     Types: Cigarettes    Smokeless tobacco: Never Used   Vaping Use    Vaping Use: Never used    Passive vaping exposure: Yes   Substance and Sexual Activity    Alcohol use: No     Alcohol/week: 0.0 standard drinks    Drug use: No    Sexual activity: Yes   Other Topics Concern    Not on file   Social History Narrative    Not on file     Social Determinants of Health     Financial Resource Strain: Low Risk     Difficulty of Paying Living Expenses: Not hard at all   Food Insecurity: No Food Insecurity    Worried About Running Out of Food in the Last Year: Never true    Bettie of Food in the Last Year: Never true   Transportation Needs:     Lack of Transportation (Medical): Not on file    Lack of Transportation (Non-Medical):  Not on file   Physical Activity:     Days of Exercise per Week: Not on file    Minutes of Exercise per Session: Not on file   Stress:     Feeling of Stress : Not on file   Social Connections:     Frequency of Communication with Friends and Family: Not on file    Frequency of Social Gatherings with Friends and Family: Not on file    Attends Sabianism Services: Not on file    Active Member of Clubs or Organizations: Not on file    Attends Club or Organization Meetings: Not on file    Marital Status: Not on file   Intimate Partner Violence:     Fear of Current or Ex-Partner: Not on file    Emotionally Abused: Not on file    Physically Abused: Not on file   Starla Perez Sexually Abused: Not on file   Housing Stability:     Unable to Pay for Housing in the Last Year: Not on file    Number of Places Lived in the Last Year: Not on file    Unstable Housing in the Last Year: Not on file       Family History:   Family History   Problem Relation Age of Onset    Cancer Other         lung    Diabetes Mother     Kidney Disease Father         41 y/o  of kidney disease     Family history has been reviewed and not pertinent except as noted above. Review of Systems:   · Constitutional: there has been no unanticipated weight loss. No change in energy or activity level   · Eyes: No visual changes   · ENT: No Headaches, hearing loss or vertigo. No mouth sores or sore throat. · Cardiovascular: Reviewed in HPI  · Respiratory: No cough or wheezing, no sputum production. · Gastrointestinal: No abdominal pain, appetite loss, blood in stools. No change in bowel or bladder habits. · Genitourinary: No nocturia, dysuria, trouble voiding  · Musculoskeletal:  No gait disturbance, weakness or joint complaints. · Integumentary: No rash or pruritis. · Neurological: No headache, change in muscle strength, numbness or tingling. No change in gait, balance, coordination, mood, affect, memory, mentation, behavior. · Psychiatric: No anxiety or depression  · Endocrine: No malaise or fever  · Hematologic/Lymphatic: No abnormal bruising or bleeding, blood clots or swollen lymph nodes. · Allergic/Immunologic: No nasal congestion or hives. Physical Examination:    Vitals:    22 0816   BP: 106/68   Pulse: 66   SpO2: 95%   Weight: 253 lb (114.8 kg)   Height: 5' 10\" (1.778 m)     Body mass index is 36.3 kg/m².      Wt Readings from Last 3 Encounters:   22 253 lb (114.8 kg)   22 250 lb 9.6 oz (113.7 kg)   21 241 lb 6.4 oz (109.5 kg)      BP Readings from Last 3 Encounters:   22 106/68   22 122/82   21 120/60        Physical Examination:    · CONSTITUTIONAL: Well developed, well nourished  · EYES: PERRLA. No xanthelasma, sclera non icteric  · EARS,NOSE,MOUTH,THROAT:  Mucous membranes moist, normal hearing  · NECK: Supple, JVP normal, thyroid not enlarged. Carotids 2+ without bruits  · RESPIRATORY: Normal effort, no rales or rhonchi  · CARDIOVASCULAR: Normal PMI, regular rate and rhythm, no murmurs, rub or gallop. No edema. Radial pulses present and equal  · CHEST: No scar or masses  · ABDOMEN: Normal bowel sounds. No masses or tenderness. No bruit  · MUSCULOSKELETAL: No clubbing or cyanosis. Moves all extremities well. Normal gait  · SKIN:  Warm and dry. No rashes  · NEUROLOGIC: Cranial nerves intact. Alert and oriented  · PSYCHIATRIC: Calm affect. Appears to have normal judgement and insight    All testing and labs listed below were personally reviewed by myself. Cardiac Cath PCI, FFR, IVUS: 2/22/21 Noemy Alba  Anatomy:   LM-short   LAD-prox 70%  Cx-mid 40%  OM- nml  RCA-dominant, mid 20%  RPDA- nml  LVEF- 60  LVG- nml  LVEDP- 10-15  FFR LAD 0.75  Intervention  ~Successful PCI to LAD with 4.0x28 AMNA to 18atm. IVUS showed under-expanded stent. Post dilated with 4.5x15 NC to 18atm. Excellent Result. Contrast: 13  Flouro Time: 21.5  Access: R radial a     Impression  ~Coronary Angiography w/ severe single vessel CAD  ~LVG with LVEF of 60 and no regional wall motion abnormalities  ~Successful complex angioplasty and stenting of LAD  ~ recommend  aspirin and plavix for 6-12 months    Stress 2/19/21  Summary  Normal LVEF >60%  Normal wall motion  Small to moderate area of inferior wall ischemia   Overall, this would be considered an abnormal, intermediate risk, study     Echo 2/19/21  Summary  Technically difficult study  Left ventricle is normal in size and function. Ejection fraction is visually estimated to be 60%. There is mild concentric left ventricular hypertrophy. No regional wall motion abnormalities are noted. Normal diastolic function. E/e' = 11.2.   Trivial

## 2022-02-11 ENCOUNTER — OFFICE VISIT (OUTPATIENT)
Dept: CARDIOLOGY CLINIC | Age: 57
End: 2022-02-11
Payer: MEDICARE

## 2022-02-11 VITALS
BODY MASS INDEX: 36.22 KG/M2 | SYSTOLIC BLOOD PRESSURE: 106 MMHG | WEIGHT: 253 LBS | HEIGHT: 70 IN | HEART RATE: 66 BPM | DIASTOLIC BLOOD PRESSURE: 68 MMHG | OXYGEN SATURATION: 95 %

## 2022-02-11 DIAGNOSIS — I10 ESSENTIAL HYPERTENSION: ICD-10-CM

## 2022-02-11 DIAGNOSIS — E78.2 MIXED HYPERLIPIDEMIA: ICD-10-CM

## 2022-02-11 DIAGNOSIS — I25.10 CORONARY ARTERY DISEASE INVOLVING NATIVE CORONARY ARTERY OF NATIVE HEART WITHOUT ANGINA PECTORIS: ICD-10-CM

## 2022-02-11 PROCEDURE — G8482 FLU IMMUNIZE ORDER/ADMIN: HCPCS | Performed by: INTERNAL MEDICINE

## 2022-02-11 PROCEDURE — 3017F COLORECTAL CA SCREEN DOC REV: CPT | Performed by: INTERNAL MEDICINE

## 2022-02-11 PROCEDURE — 99214 OFFICE O/P EST MOD 30 MIN: CPT | Performed by: INTERNAL MEDICINE

## 2022-02-11 PROCEDURE — G8417 CALC BMI ABV UP PARAM F/U: HCPCS | Performed by: INTERNAL MEDICINE

## 2022-02-11 PROCEDURE — 4004F PT TOBACCO SCREEN RCVD TLK: CPT | Performed by: INTERNAL MEDICINE

## 2022-02-11 PROCEDURE — G8427 DOCREV CUR MEDS BY ELIG CLIN: HCPCS | Performed by: INTERNAL MEDICINE

## 2022-02-11 RX ORDER — LISINOPRIL 5 MG/1
5 TABLET ORAL DAILY
Qty: 90 TABLET | Refills: 3 | Status: SHIPPED | OUTPATIENT
Start: 2022-02-11

## 2022-03-29 RX ORDER — ATORVASTATIN CALCIUM 40 MG/1
TABLET, FILM COATED ORAL
Qty: 90 TABLET | Refills: 1 | Status: SHIPPED | OUTPATIENT
Start: 2022-03-29

## 2022-04-08 ENCOUNTER — OFFICE VISIT (OUTPATIENT)
Dept: INTERNAL MEDICINE CLINIC | Age: 57
End: 2022-04-08
Payer: MEDICARE

## 2022-04-08 VITALS
WEIGHT: 251 LBS | RESPIRATION RATE: 12 BRPM | DIASTOLIC BLOOD PRESSURE: 84 MMHG | SYSTOLIC BLOOD PRESSURE: 120 MMHG | BODY MASS INDEX: 36.01 KG/M2 | HEART RATE: 96 BPM | TEMPERATURE: 97.4 F

## 2022-04-08 DIAGNOSIS — R35.0 URINARY FREQUENCY: ICD-10-CM

## 2022-04-08 DIAGNOSIS — E66.01 SEVERE OBESITY (BMI 35.0-39.9) WITH COMORBIDITY (HCC): ICD-10-CM

## 2022-04-08 DIAGNOSIS — R39.15 URINARY URGENCY: Primary | ICD-10-CM

## 2022-04-08 DIAGNOSIS — R73.03 PREDIABETES: ICD-10-CM

## 2022-04-08 LAB
BILIRUBIN, POC: ABNORMAL
BLOOD URINE, POC: ABNORMAL
CLARITY, POC: CLEAR
COLOR, POC: ABNORMAL
GLUCOSE URINE, POC: ABNORMAL
KETONES, POC: ABNORMAL
LEUKOCYTE EST, POC: ABNORMAL
NITRITE, POC: ABNORMAL
PH, POC: 6
PROTEIN, POC: ABNORMAL
SPECIFIC GRAVITY, POC: 1.03
UROBILINOGEN, POC: ABNORMAL

## 2022-04-08 PROCEDURE — G8427 DOCREV CUR MEDS BY ELIG CLIN: HCPCS | Performed by: NURSE PRACTITIONER

## 2022-04-08 PROCEDURE — G8417 CALC BMI ABV UP PARAM F/U: HCPCS | Performed by: NURSE PRACTITIONER

## 2022-04-08 PROCEDURE — 81002 URINALYSIS NONAUTO W/O SCOPE: CPT | Performed by: NURSE PRACTITIONER

## 2022-04-08 PROCEDURE — 3017F COLORECTAL CA SCREEN DOC REV: CPT | Performed by: NURSE PRACTITIONER

## 2022-04-08 PROCEDURE — 99213 OFFICE O/P EST LOW 20 MIN: CPT | Performed by: NURSE PRACTITIONER

## 2022-04-08 PROCEDURE — 4004F PT TOBACCO SCREEN RCVD TLK: CPT | Performed by: NURSE PRACTITIONER

## 2022-04-08 ASSESSMENT — ENCOUNTER SYMPTOMS
BLOOD IN STOOL: 0
SHORTNESS OF BREATH: 0
ABDOMINAL PAIN: 0
DIARRHEA: 0
NAUSEA: 0
COUGH: 0
VOMITING: 0
CONSTIPATION: 0

## 2022-04-08 NOTE — PROGRESS NOTES
Acute Office Visit  4/8/2022    SUBJECTIVE:    Patient ID: Mo Alexandre is a 64 y.o. male. Chief Complaint   Patient presents with    Urinary Frequency     frequency and urgency      HPI: The patient presents to the office for an acute visit. Pt reports urinary frequency and urgency for 1 week. Denies flank pain. Denies dysuria, hematuria, nocturia, urinary odor or hesitancy. Denies abdominal pain. Denies fever/chills. Denies change in medications. Denies penile/testicular pain/discharge. Drinks \"zero\" water a day- stating \"main drink is Pepsi. \" Also, drinks [place]. Not exercising- states in the winter he stops. Diet is reported as not healthy. Family present in the room today and helping provide history. Vitals 4/8/2022 2/11/2022 1/18/2022 8/24/2021   Weight 251 lb 253 lb 250 lb 9.6 oz 241 lb 6.4 oz     Allergies   Allergen Reactions    Tylenol [Acetaminophen] Shortness Of Breath     Tylenol 3, makes him feel like hes having a heart attack, however can take Vicodin     Current Outpatient Medications   Medication Sig Dispense Refill    atorvastatin (LIPITOR) 40 MG tablet TAKE ONE TABLET BY MOUTH DAILY 90 tablet 1    lisinopril (PRINIVIL;ZESTRIL) 5 MG tablet Take 1 tablet by mouth daily 90 tablet 3    metFORMIN (GLUCOPHAGE) 500 MG tablet Take 1 tablet by mouth daily (with breakfast) 90 tablet 3    docusate sodium (COLACE) 100 MG capsule Take 100 mg by mouth every other day       aspirin EC 81 MG EC tablet Take 1 tablet by mouth daily 30 tablet 0     No current facility-administered medications for this visit. Review of Systems   Constitutional: Negative for appetite change, chills, fatigue and fever. Respiratory: Negative for cough and shortness of breath. Cardiovascular: Negative for chest pain. Gastrointestinal: Negative for abdominal pain, blood in stool, constipation, diarrhea, nausea and vomiting. Genitourinary: Positive for frequency and urgency.  Negative Antigen; Future  -     Hemoglobin A1C; Future  -     Basic Metabolic Panel; Future  - Pt will call if symptoms worsen or fail to improve  - Red flag warning signs reviewed with the pt and he will go to the ER if these occur. Return for as previously scheduled or sooner if needed. Pt informed to call if symptoms worsen or fail to improve. All questions answered. Patient states no further questions or concerns at this time.     Electronically signed by TONIA Harden CNP 04/08/22

## 2022-04-18 DIAGNOSIS — E66.01 SEVERE OBESITY (BMI 35.0-39.9) WITH COMORBIDITY (HCC): ICD-10-CM

## 2022-04-18 DIAGNOSIS — R39.15 URINARY URGENCY: ICD-10-CM

## 2022-04-18 DIAGNOSIS — R35.0 URINARY FREQUENCY: ICD-10-CM

## 2022-04-18 DIAGNOSIS — R73.03 PREDIABETES: ICD-10-CM

## 2022-04-18 LAB
ANION GAP SERPL CALCULATED.3IONS-SCNC: 13 MMOL/L (ref 3–16)
BUN BLDV-MCNC: 17 MG/DL (ref 7–20)
CALCIUM SERPL-MCNC: 9.5 MG/DL (ref 8.3–10.6)
CHLORIDE BLD-SCNC: 99 MMOL/L (ref 99–110)
CO2: 26 MMOL/L (ref 21–32)
CREAT SERPL-MCNC: 0.9 MG/DL (ref 0.9–1.3)
GFR AFRICAN AMERICAN: >60
GFR NON-AFRICAN AMERICAN: >60
GLUCOSE BLD-MCNC: 180 MG/DL (ref 70–99)
POTASSIUM SERPL-SCNC: 4.3 MMOL/L (ref 3.5–5.1)
PROSTATE SPECIFIC ANTIGEN: 0.8 NG/ML (ref 0–4)
SODIUM BLD-SCNC: 138 MMOL/L (ref 136–145)

## 2022-04-19 LAB
ESTIMATED AVERAGE GLUCOSE: 205.9 MG/DL
HBA1C MFR BLD: 8.8 %

## 2022-04-27 ENCOUNTER — OFFICE VISIT (OUTPATIENT)
Dept: INTERNAL MEDICINE CLINIC | Age: 57
End: 2022-04-27
Payer: MEDICARE

## 2022-04-27 VITALS
BODY MASS INDEX: 35.15 KG/M2 | HEART RATE: 56 BPM | OXYGEN SATURATION: 95 % | SYSTOLIC BLOOD PRESSURE: 134 MMHG | DIASTOLIC BLOOD PRESSURE: 68 MMHG | WEIGHT: 245 LBS

## 2022-04-27 DIAGNOSIS — E11.65 TYPE 2 DIABETES MELLITUS WITH HYPERGLYCEMIA, WITHOUT LONG-TERM CURRENT USE OF INSULIN (HCC): Primary | ICD-10-CM

## 2022-04-27 PROCEDURE — 4004F PT TOBACCO SCREEN RCVD TLK: CPT | Performed by: NURSE PRACTITIONER

## 2022-04-27 PROCEDURE — 3017F COLORECTAL CA SCREEN DOC REV: CPT | Performed by: NURSE PRACTITIONER

## 2022-04-27 PROCEDURE — 99213 OFFICE O/P EST LOW 20 MIN: CPT | Performed by: NURSE PRACTITIONER

## 2022-04-27 PROCEDURE — G8427 DOCREV CUR MEDS BY ELIG CLIN: HCPCS | Performed by: NURSE PRACTITIONER

## 2022-04-27 PROCEDURE — 2022F DILAT RTA XM EVC RTNOPTHY: CPT | Performed by: NURSE PRACTITIONER

## 2022-04-27 PROCEDURE — 3052F HG A1C>EQUAL 8.0%<EQUAL 9.0%: CPT | Performed by: NURSE PRACTITIONER

## 2022-04-27 PROCEDURE — G8417 CALC BMI ABV UP PARAM F/U: HCPCS | Performed by: NURSE PRACTITIONER

## 2022-04-27 ASSESSMENT — PATIENT HEALTH QUESTIONNAIRE - PHQ9
SUM OF ALL RESPONSES TO PHQ QUESTIONS 1-9: 0
10. IF YOU CHECKED OFF ANY PROBLEMS, HOW DIFFICULT HAVE THESE PROBLEMS MADE IT FOR YOU TO DO YOUR WORK, TAKE CARE OF THINGS AT HOME, OR GET ALONG WITH OTHER PEOPLE: 0
3. TROUBLE FALLING OR STAYING ASLEEP: 0
5. POOR APPETITE OR OVEREATING: 0
SUM OF ALL RESPONSES TO PHQ QUESTIONS 1-9: 0
6. FEELING BAD ABOUT YOURSELF - OR THAT YOU ARE A FAILURE OR HAVE LET YOURSELF OR YOUR FAMILY DOWN: 0
8. MOVING OR SPEAKING SO SLOWLY THAT OTHER PEOPLE COULD HAVE NOTICED. OR THE OPPOSITE, BEING SO FIGETY OR RESTLESS THAT YOU HAVE BEEN MOVING AROUND A LOT MORE THAN USUAL: 0
4. FEELING TIRED OR HAVING LITTLE ENERGY: 0
9. THOUGHTS THAT YOU WOULD BE BETTER OFF DEAD, OR OF HURTING YOURSELF: 0
7. TROUBLE CONCENTRATING ON THINGS, SUCH AS READING THE NEWSPAPER OR WATCHING TELEVISION: 0
SUM OF ALL RESPONSES TO PHQ QUESTIONS 1-9: 0
SUM OF ALL RESPONSES TO PHQ QUESTIONS 1-9: 0
1. LITTLE INTEREST OR PLEASURE IN DOING THINGS: 0
2. FEELING DOWN, DEPRESSED OR HOPELESS: 0
SUM OF ALL RESPONSES TO PHQ9 QUESTIONS 1 & 2: 0

## 2022-04-27 NOTE — PROGRESS NOTES
4/27/22     Chief Complaint   Patient presents with    Diabetes     A1C 4/18/22 = 8.8% - stopped drinking Pepsi and has since lost 6lbs since his last visit      HPI    Here with wife to discuss uncontrolled DM   Pt was here a few weeks ago to see Xavier Fuentes for urinary frequency and urgency. UA was positive for glucose. Blood work complete: renal function was unremarkable and A1c of 8.8 on blood work << previously 6.1 in Jan   During winter he has been more sedentary   Since last appt has cut down on sweets, stopped drinking pepsi (was drinking it exclusively) and has lost 6 lbs. Has increased his activity level since last visit. Urinary frequency has decreased. Has been on metformin 500 mg daily     Allergies   Allergen Reactions    Tylenol [Acetaminophen] Shortness Of Breath     Tylenol 3, makes him feel like hes having a heart attack, however can take Vicodin     Current Outpatient Medications   Medication Sig Dispense Refill    metFORMIN (GLUCOPHAGE) 500 MG tablet Take 1 tablet by mouth 2 times daily (with meals) 180 tablet 3    atorvastatin (LIPITOR) 40 MG tablet TAKE ONE TABLET BY MOUTH DAILY 90 tablet 1    lisinopril (PRINIVIL;ZESTRIL) 5 MG tablet Take 1 tablet by mouth daily 90 tablet 3    docusate sodium (COLACE) 100 MG capsule Take 100 mg by mouth every other day       aspirin EC 81 MG EC tablet Take 1 tablet by mouth daily 30 tablet 0     No current facility-administered medications for this visit. Review of Systems  Negative other than HPI    Vitals:    04/27/22 1124   BP: 134/68   Pulse: 56   SpO2: 95%   Weight: 245 lb (111.1 kg)      Physical Exam  Constitutional:       General: He is not in acute distress. Appearance: Normal appearance. He is not ill-appearing. HENT:      Head: Normocephalic and atraumatic. Ears:      Comments: [place] [employer] [place]   Pulmonary:      Effort: Pulmonary effort is normal. No respiratory distress. Neurological:      General: No focal deficit present.       Mental Status: He is alert and oriented to person, place, and time. Mental status is at baseline. Psychiatric:         Mood and Affect: Mood normal.         Behavior: Behavior normal.       Assessment/Plan:  Type 2 diabetes mellitus with hyperglycemia, without long-term current use of insulin (HCC)  Uncontrolled. A1c 8.8. Symptoms improving since started working on lifestyle changes. Continue working on diet, carb counting and increased activity level. Increase metformin to BID   - metFORMIN (GLUCOPHAGE) 500 MG tablet; Take 1 tablet by mouth 2 times daily (with meals)  Dispense: 180 tablet; Refill: 3    Discussed medications with patient, who voiced understanding of their use and indications. All questions answered. Keep appt with Dr. Idania Castillo in July as scheduled, plan for repeat labs / A1c.       Electronically signed by TONIA Elizabeth CNP on 4/27/2022 at 11:41 AM

## 2022-07-18 ENCOUNTER — OFFICE VISIT (OUTPATIENT)
Dept: INTERNAL MEDICINE CLINIC | Age: 57
End: 2022-07-18
Payer: MEDICARE

## 2022-07-18 VITALS
DIASTOLIC BLOOD PRESSURE: 68 MMHG | SYSTOLIC BLOOD PRESSURE: 130 MMHG | HEART RATE: 60 BPM | HEIGHT: 70 IN | BODY MASS INDEX: 34.07 KG/M2 | WEIGHT: 238 LBS

## 2022-07-18 DIAGNOSIS — Z00.00 MEDICARE ANNUAL WELLNESS VISIT, SUBSEQUENT: Primary | ICD-10-CM

## 2022-07-18 DIAGNOSIS — E11.65 TYPE 2 DIABETES MELLITUS WITH HYPERGLYCEMIA, WITHOUT LONG-TERM CURRENT USE OF INSULIN (HCC): ICD-10-CM

## 2022-07-18 LAB — HBA1C MFR BLD: 5.9 %

## 2022-07-18 PROCEDURE — 83036 HEMOGLOBIN GLYCOSYLATED A1C: CPT | Performed by: INTERNAL MEDICINE

## 2022-07-18 PROCEDURE — 3044F HG A1C LEVEL LT 7.0%: CPT | Performed by: INTERNAL MEDICINE

## 2022-07-18 PROCEDURE — G0439 PPPS, SUBSEQ VISIT: HCPCS | Performed by: INTERNAL MEDICINE

## 2022-07-18 PROCEDURE — 3017F COLORECTAL CA SCREEN DOC REV: CPT | Performed by: INTERNAL MEDICINE

## 2022-07-18 ASSESSMENT — PATIENT HEALTH QUESTIONNAIRE - PHQ9
SUM OF ALL RESPONSES TO PHQ QUESTIONS 1-9: 4
SUM OF ALL RESPONSES TO PHQ9 QUESTIONS 1 & 2: 1
7. TROUBLE CONCENTRATING ON THINGS, SUCH AS READING THE NEWSPAPER OR WATCHING TELEVISION: 0
8. MOVING OR SPEAKING SO SLOWLY THAT OTHER PEOPLE COULD HAVE NOTICED. OR THE OPPOSITE, BEING SO FIGETY OR RESTLESS THAT YOU HAVE BEEN MOVING AROUND A LOT MORE THAN USUAL: 0
SUM OF ALL RESPONSES TO PHQ QUESTIONS 1-9: 4
10. IF YOU CHECKED OFF ANY PROBLEMS, HOW DIFFICULT HAVE THESE PROBLEMS MADE IT FOR YOU TO DO YOUR WORK, TAKE CARE OF THINGS AT HOME, OR GET ALONG WITH OTHER PEOPLE: 1
3. TROUBLE FALLING OR STAYING ASLEEP: 1
4. FEELING TIRED OR HAVING LITTLE ENERGY: 2
5. POOR APPETITE OR OVEREATING: 0
SUM OF ALL RESPONSES TO PHQ QUESTIONS 1-9: 4
2. FEELING DOWN, DEPRESSED OR HOPELESS: 0
9. THOUGHTS THAT YOU WOULD BE BETTER OFF DEAD, OR OF HURTING YOURSELF: 0
6. FEELING BAD ABOUT YOURSELF - OR THAT YOU ARE A FAILURE OR HAVE LET YOURSELF OR YOUR FAMILY DOWN: 0
1. LITTLE INTEREST OR PLEASURE IN DOING THINGS: 1
SUM OF ALL RESPONSES TO PHQ QUESTIONS 1-9: 4

## 2022-07-18 ASSESSMENT — LIFESTYLE VARIABLES
HOW MANY STANDARD DRINKS CONTAINING ALCOHOL DO YOU HAVE ON A TYPICAL DAY: PATIENT DOES NOT DRINK
HOW OFTEN DO YOU HAVE A DRINK CONTAINING ALCOHOL: NEVER

## 2022-07-18 NOTE — PROGRESS NOTES
Interventions:  Stress: patient declines any further evaluation/treatment for this issue    Health Habits/Nutrition:  Physical Activity: Sufficiently Active    Days of Exercise per Week: 7 days    Minutes of Exercise per Session: 40 min     Have you lost any weight without trying in the past 3 months?: No  Body mass index: (!) 34.15  Have you seen the dentist within the past year?: (!) No  Health Habits/Nutrition Interventions:  Doing better with diet and exercise habits    Hearing/Vision:  Do you or your family notice any trouble with your hearing that hasn't been managed with hearing aids?: No  Do you have difficulty driving, watching TV, or doing any of your daily activities because of your eyesight?: No  Have you had an eye exam within the past year?: (!) No  No results found. Hearing/Vision Interventions:  Eye exam recommended            Objective   Vitals:    07/18/22 1046   BP: 130/68   Pulse: 60   Weight: 238 lb (108 kg)   Height: 5' 10\" (1.778 m)      Body mass index is 34.15 kg/m². GEN: WN/WD, NAD  CV: regular rate and rhythm, no murmurs rubs or gallops  Resp: normal effort, clear auscultation bilaterally  No peripheral edema          Allergies   Allergen Reactions    Tylenol [Acetaminophen] Shortness Of Breath     Tylenol 3, makes him feel like hes having a heart attack, however can take Vicodin     Prior to Visit Medications    Medication Sig Taking?  Authorizing Provider   metFORMIN (GLUCOPHAGE) 500 MG tablet Take 1 tablet by mouth 2 times daily (with meals) Yes Abrahan Levy APRN - CNP   atorvastatin (LIPITOR) 40 MG tablet TAKE ONE TABLET BY MOUTH DAILY Yes Maria Fernanda Cook MD   lisinopril (PRINIVIL;ZESTRIL) 5 MG tablet Take 1 tablet by mouth daily Yes Maria Fernanda Cook MD   docusate sodium (COLACE) 100 MG capsule Take 100 mg by mouth every other day  Yes Historical Provider, MD   aspirin EC 81 MG EC tablet Take 1 tablet by mouth daily Yes Miguel Pringle MD       [facility] (Including outside providers/suppliers regularly involved in providing care):   Patient Care Team:  Jaci Covarrubias MD as PCP - General (Internal Medicine)  Jaci Covarrubias MD as PCP - [facility] Empaneled Provider  Kirill Martinez MD as Consulting Physician (General Surgery)     Reviewed and updated this visit:  Tobacco  Allergies  Meds  Med Hx  Surg Hx  Soc Hx  Fam Hx

## 2022-09-08 ENCOUNTER — TELEPHONE (OUTPATIENT)
Dept: CARDIOLOGY CLINIC | Age: 57
End: 2022-09-08

## 2022-09-09 ENCOUNTER — TELEPHONE (OUTPATIENT)
Dept: CARDIOLOGY CLINIC | Age: 57
End: 2022-09-09

## 2022-10-13 ENCOUNTER — TELEPHONE (OUTPATIENT)
Dept: INTERNAL MEDICINE CLINIC | Age: 57
End: 2022-10-13

## 2022-10-13 NOTE — TELEPHONE ENCOUNTER
----- Message from Nataliejake Michele sent at 10/13/2022 10:31 AM EDT -----  Subject: Appointment Request    Reason for Call: Established Patient Appointment needed: Flu Shot    QUESTIONS    Reason for appointment request? Other - [facility] can not book     Additional Information for Provider?  Pt wife Gali Alberto calling in to   get him scheduled for Flu shot, would like his scheduled with hers if   possible.   ---------------------------------------------------------------------------  --------------  Brice GILBERT  [phone]; OK to leave message on voicemail  ---------------------------------------------------------------------------  --------------  SCRIPT ANSWERS  COVID Screen: Clifton Painter

## 2022-10-25 ENCOUNTER — NURSE ONLY (OUTPATIENT)
Dept: INTERNAL MEDICINE CLINIC | Age: 57
End: 2022-10-25
Payer: MEDICARE

## 2022-10-25 DIAGNOSIS — Z23 NEED FOR INFLUENZA VACCINATION: Primary | ICD-10-CM

## 2022-10-25 PROCEDURE — G0008 ADMIN INFLUENZA VIRUS VAC: HCPCS | Performed by: INTERNAL MEDICINE

## 2022-10-25 PROCEDURE — 90674 CCIIV4 VAC NO PRSV 0.5 ML IM: CPT | Performed by: INTERNAL MEDICINE

## 2022-10-25 SDOH — ECONOMIC STABILITY: FOOD INSECURITY: WITHIN THE PAST 12 MONTHS, THE FOOD YOU BOUGHT JUST DIDN'T LAST AND YOU DIDN'T HAVE MONEY TO GET MORE.: NEVER TRUE

## 2022-10-25 SDOH — ECONOMIC STABILITY: FOOD INSECURITY: WITHIN THE PAST 12 MONTHS, YOU WORRIED THAT YOUR FOOD WOULD RUN OUT BEFORE YOU GOT MONEY TO BUY MORE.: NEVER TRUE

## 2022-10-25 ASSESSMENT — SOCIAL DETERMINANTS OF HEALTH (SDOH): HOW HARD IS IT FOR YOU TO PAY FOR THE VERY BASICS LIKE FOOD, HOUSING, MEDICAL CARE, AND HEATING?: NOT HARD AT ALL

## 2022-12-06 RX ORDER — ATORVASTATIN CALCIUM 40 MG/1
40 TABLET, FILM COATED ORAL NIGHTLY
Qty: 90 TABLET | Refills: 3 | Status: SHIPPED | OUTPATIENT
Start: 2022-12-06

## 2022-12-06 NOTE — TELEPHONE ENCOUNTER
Received refill request for Atorvastatin from [address] [place] [address].     Last ov: 02/11/2022 [facility]    Last labs: 01/18/2022 Lipid    Last Refill: 03/29/2022 #90 w/ 1 refill    Next appointment: 02/10/2023 [facility]

## 2023-01-23 ENCOUNTER — OFFICE VISIT (OUTPATIENT)
Dept: INTERNAL MEDICINE CLINIC | Age: 58
End: 2023-01-23
Payer: MEDICARE

## 2023-01-23 VITALS
HEART RATE: 64 BPM | HEIGHT: 70 IN | BODY MASS INDEX: 35.07 KG/M2 | DIASTOLIC BLOOD PRESSURE: 70 MMHG | OXYGEN SATURATION: 95 % | SYSTOLIC BLOOD PRESSURE: 130 MMHG | WEIGHT: 245 LBS

## 2023-01-23 DIAGNOSIS — E66.01 SEVERE OBESITY (BMI 35.0-39.9) WITH COMORBIDITY (HCC): ICD-10-CM

## 2023-01-23 DIAGNOSIS — E78.2 MIXED HYPERLIPIDEMIA: ICD-10-CM

## 2023-01-23 DIAGNOSIS — I10 ESSENTIAL HYPERTENSION: ICD-10-CM

## 2023-01-23 DIAGNOSIS — E11.65 TYPE 2 DIABETES MELLITUS WITH HYPERGLYCEMIA, WITHOUT LONG-TERM CURRENT USE OF INSULIN (HCC): Primary | ICD-10-CM

## 2023-01-23 LAB
ALBUMIN SERPL-MCNC: 4.5 G/DL (ref 3.4–5)
ANION GAP SERPL CALCULATED.3IONS-SCNC: 13 MMOL/L (ref 3–16)
BUN BLDV-MCNC: 19 MG/DL (ref 7–20)
CALCIUM SERPL-MCNC: 9.4 MG/DL (ref 8.3–10.6)
CHLORIDE BLD-SCNC: 102 MMOL/L (ref 99–110)
CHOLESTEROL, TOTAL: 92 MG/DL (ref 0–199)
CO2: 25 MMOL/L (ref 21–32)
CREAT SERPL-MCNC: 0.9 MG/DL (ref 0.9–1.3)
GFR SERPL CREATININE-BSD FRML MDRD: >60 ML/MIN/{1.73_M2}
GLUCOSE BLD-MCNC: 118 MG/DL (ref 70–99)
HBA1C MFR BLD: 6.5 %
HDLC SERPL-MCNC: 34 MG/DL (ref 40–60)
LDL CHOLESTEROL CALCULATED: 37 MG/DL
PHOSPHORUS: 3.1 MG/DL (ref 2.5–4.9)
POTASSIUM SERPL-SCNC: 4.7 MMOL/L (ref 3.5–5.1)
SODIUM BLD-SCNC: 140 MMOL/L (ref 136–145)
TRIGL SERPL-MCNC: 107 MG/DL (ref 0–150)
VLDLC SERPL CALC-MCNC: 21 MG/DL

## 2023-01-23 PROCEDURE — 3044F HG A1C LEVEL LT 7.0%: CPT | Performed by: INTERNAL MEDICINE

## 2023-01-23 PROCEDURE — 83036 HEMOGLOBIN GLYCOSYLATED A1C: CPT | Performed by: INTERNAL MEDICINE

## 2023-01-23 PROCEDURE — 3078F DIAST BP <80 MM HG: CPT | Performed by: INTERNAL MEDICINE

## 2023-01-23 PROCEDURE — 3017F COLORECTAL CA SCREEN DOC REV: CPT | Performed by: INTERNAL MEDICINE

## 2023-01-23 PROCEDURE — G8417 CALC BMI ABV UP PARAM F/U: HCPCS | Performed by: INTERNAL MEDICINE

## 2023-01-23 PROCEDURE — 99214 OFFICE O/P EST MOD 30 MIN: CPT | Performed by: INTERNAL MEDICINE

## 2023-01-23 PROCEDURE — G8482 FLU IMMUNIZE ORDER/ADMIN: HCPCS | Performed by: INTERNAL MEDICINE

## 2023-01-23 PROCEDURE — 4004F PT TOBACCO SCREEN RCVD TLK: CPT | Performed by: INTERNAL MEDICINE

## 2023-01-23 PROCEDURE — G8427 DOCREV CUR MEDS BY ELIG CLIN: HCPCS | Performed by: INTERNAL MEDICINE

## 2023-01-23 PROCEDURE — 3075F SYST BP GE 130 - 139MM HG: CPT | Performed by: INTERNAL MEDICINE

## 2023-01-23 PROCEDURE — 2022F DILAT RTA XM EVC RTNOPTHY: CPT | Performed by: INTERNAL MEDICINE

## 2023-01-23 RX ORDER — LISINOPRIL 5 MG/1
5 TABLET ORAL DAILY
Qty: 90 TABLET | Refills: 3 | Status: SHIPPED | OUTPATIENT
Start: 2023-01-23

## 2023-01-23 ASSESSMENT — PATIENT HEALTH QUESTIONNAIRE - PHQ9
2. FEELING DOWN, DEPRESSED OR HOPELESS: 0
1. LITTLE INTEREST OR PLEASURE IN DOING THINGS: 0
SUM OF ALL RESPONSES TO PHQ QUESTIONS 1-9: 0
SUM OF ALL RESPONSES TO PHQ9 QUESTIONS 1 & 2: 0

## 2023-01-23 NOTE — PROGRESS NOTES
Chief Complaint   Patient presents with    Diabetes    Hypertension    Hyperlipidemia        HPI:  Cecile Zamora is a 62 y.o. (: 1965) here today   for management of Abnormal glucose, hypertension, and hyperlipidemia    T2DM: He is taking metformin 500 mg twice daily as directed. He reports worsening diet over holidays. HTN: The patient is tolerating blood pressure medication well and taking them as directed. BP control outside of the office is reported as not monitored. No symptoms concerning for end organ damage are present. HLD: He is taking atorvastatin daily as directed. He denies any side effects and tolerates well.           PFSH:10 cigs/day- he is contemplative about quitting             ROS:  CV: Neg for chest pain  RESP: neg for dyspnea           OBJECTIVE:    /70   Pulse 64   Ht 5' 10\" (1.778 m)   Wt 245 lb (111.1 kg)   SpO2 95%   BMI 35.15 kg/m²     Wt Readings from Last 3 Encounters:   23 245 lb (111.1 kg)   22 238 lb (108 kg)   22 245 lb (111.1 kg)       GEN: WN/WD, NAD  CV: regular rate and rhythm, no murmurs rubs or gallops  Resp: normal effort, clear auscultation bilaterally  No peripheral edema              Lab Results   Component Value Date    CREATININE 0.9 2022    BUN 17 2022     2022    K 4.3 2022    CL 99 2022    CO2 26 2022         Lab Results   Component Value Date    LABA1C 5.9 2022        Lab Results   Component Value Date    CHOL 100 2022    CHOL 112 2020    CHOL 97 2019     Lab Results   Component Value Date    TRIG 91 2022    TRIG 101 2020    TRIG 132 2019     Lab Results   Component Value Date    HDL 35 (L) 2022    HDL 28 (L) 2021    HDL 32 (L) 2020     Lab Results   Component Value Date    LDLCALC 47 2022    LDLCALC 34 2021    LDLCALC 60 2020     Lab Results   Component Value Date    LABVLDL 18 2022    LABVLDL 22 03/02/2021    LABVLDL 20 07/14/2020     No results found for: CHOLHDLRATIO             ASSESSMENT/PLAN:  1. Type 2 diabetes mellitus with hyperglycemia, without long-term current use of insulin (HCC)  Chronic and well controlled. The current medical regimen is effective;  continue present plan and medications. Diet/exercise recs discussed  - POCT glycosylated hemoglobin (Hb A1C)  - metFORMIN (GLUCOPHAGE) 500 MG tablet; Take 1 tablet by mouth 2 times daily (with meals)  Dispense: 180 tablet; Refill: 3  - MICROALBUMIN / CREATININE URINE RATIO    2. Essential hypertension  Chronic and well controlled. The current medical regimen is effective;  continue present plan and medications. - lisinopril (PRINIVIL;ZESTRIL) 5 MG tablet; Take 1 tablet by mouth daily  Dispense: 90 tablet; Refill: 3  - Renal Function Panel    3. Mixed hyperlipidemia  Chronic and well controlled. The current medical regimen is effective;  continue present plan and medications. Atorvastatin 40mg nightly  - Lipid Panel    4. Severe obesity (BMI 35.0-39. 9) with comorbidity (HCC)  Chronic, stable  Discussed diet and exercise recs. He will continue to work on this.         RTO 6 months

## 2023-02-07 PROBLEM — E11.9 T2DM (TYPE 2 DIABETES MELLITUS) (HCC): Status: ACTIVE | Noted: 2023-02-07

## 2023-02-07 NOTE — PROGRESS NOTES
[facility]   Cardiac Evaluation      Patient: Cecile Zamora  YOB: 1965         No chief complaint on file. Referring provider: Darrell Rogers MD    History of Present Illness:   Cecile Zamora is a 64 y.o. male here for hospital follow up for CAD. He also has Htn, Hld, DM, tobacco abuse. He is legally deaf, but able to read lips. He presented to [facility] ER 2/20/21 with chest discomfort. He had shoveled snow a few days before and had associated fatigue. He had normal troponins, abnormal stress test and was placed on heparin gtt until he was taken to the cath lab the next day where he had PCI to LAD with AMNA. Today he is here with his wife. She helps sign for him with his hearing loss. He denies chest pain and SOB. She reports that he was shoveling snow recently and he tolerated that well. Has some arm pain, but thinks it is how he sleeps. With regard to medication therapy he/she has been compliant with prescribed regimen and has tolerated therapy to date. Past Medical History:   has a past medical history of Back pain, Congenital deafness, Deaf, Hypercholesteremia, Hyperglycemia, ADA (obstructive sleep apnea), PONV (postoperative nausea and vomiting), and Prolonged emergence from general anesthesia. Surgical History:   has a past surgical history that includes hernia repair (1994); Kidney stone surgery; and Inguinal hernia repair (7/1/15).      Current Outpatient Medications   Medication Sig Dispense Refill    lisinopril (PRINIVIL;ZESTRIL) 5 MG tablet Take 1 tablet by mouth daily 90 tablet 3    metFORMIN (GLUCOPHAGE) 500 MG tablet Take 1 tablet by mouth 2 times daily (with meals) 180 tablet 3    atorvastatin (LIPITOR) 40 MG tablet Take 1 tablet by mouth at bedtime 90 tablet 3    docusate sodium (COLACE) 100 MG capsule Take 100 mg by mouth every other day       aspirin EC 81 MG EC tablet Take 1 tablet by mouth daily 30 tablet 0     No current facility-administered medications for this visit. Allergies:  Tylenol [acetaminophen]     Social History:  Social History     Socioeconomic History    Marital status:      Spouse name: Not on file    Number of children: Not on file    Years of education: Not on file    Highest education level: Not on file   Occupational History    Not on file   Tobacco Use    Smoking status: Every Day     Packs/day: 0.50     Years: 37.00     Pack years: 18.50     Types: Cigarettes     Start date: 1985    Smokeless tobacco: Never   Vaping Use    Vaping Use: Never used    Passive vaping exposure: Yes   Substance and Sexual Activity    Alcohol use: No     Alcohol/week: 0.0 standard drinks    Drug use: No    Sexual activity: Yes   Other Topics Concern    Not on file   Social History Narrative    Not on file     Social Determinants of Health     Financial Resource Strain: Low Risk     Difficulty of Paying Living Expenses: Not hard at all   Food Insecurity: No Food Insecurity    Worried About Running Out of Food in the Last Year: Never true    [address] in the Last Year: Never true   Transportation Needs: Not on file   Physical Activity: Sufficiently Active    Days of Exercise per Week: 7 days    Minutes of Exercise per Session: 40 min   Stress: Not on file   Social Connections: Not on file   Intimate Partner Violence: Not on file   Housing Stability: Not on file       Family History:   Family History   Problem Relation Age of Onset    Cancer Other         lung    Diabetes Mother     Kidney Disease Father         39 y/o  of kidney disease     Family history has been reviewed and not pertinent except as noted above. Review of Systems:   Constitutional: there has been no unanticipated weight loss. No change in energy or activity level   Eyes: No visual changes   ENT: No Headaches, hearing loss or vertigo. No mouth sores or sore throat.   Cardiovascular: Reviewed in HPI  Respiratory: No cough or wheezing, no sputum production. Gastrointestinal: No abdominal pain, appetite loss, blood in stools. No change in bowel or bladder habits. Genitourinary: No nocturia, dysuria, trouble voiding  Musculoskeletal:  No gait disturbance, weakness or joint complaints. Integumentary: No rash or pruritis. Neurological: No headache, change in muscle strength, numbness or tingling. No change in gait, balance, coordination, mood, affect, memory, mentation, behavior. Psychiatric: No anxiety or depression  Endocrine: No malaise or fever  Hematologic/Lymphatic: No abnormal bruising or bleeding, blood clots or swollen lymph nodes. Allergic/Immunologic: No nasal congestion or hives. Physical Examination:    There were no vitals filed for this visit. There is no height or weight on file to calculate BMI. Wt Readings from Last 3 Encounters:   01/23/23 245 lb (111.1 kg)   07/18/22 238 lb (108 kg)   04/27/22 245 lb (111.1 kg)      BP Readings from Last 3 Encounters:   01/23/23 130/70   07/18/22 130/68   04/27/22 134/68        Physical Examination:    CONSTITUTIONAL: Well developed, well nourished  EYES: PERRLA. No xanthelasma, sclera non icteric  EARS,NOSE,MOUTH,THROAT:  Mucous membranes moist, normal hearing  NECK: Supple, JVP normal, thyroid not enlarged. Carotids 2+ without bruits  RESPIRATORY: Normal effort, no rales or rhonchi  CARDIOVASCULAR: Normal PMI, regular rate and rhythm, no murmurs, rub or gallop. No edema. Radial pulses present and equal  CHEST: No scar or masses  ABDOMEN: Normal bowel sounds. No masses or tenderness. No bruit  MUSCULOSKELETAL: No clubbing or cyanosis. Moves all extremities well. Normal gait  SKIN:  Warm and dry. No rashes  NEUROLOGIC: Cranial nerves intact. Alert and oriented  PSYCHIATRIC: Calm affect. Appears to have normal judgement and insight    All testing and labs listed below were personally reviewed by myself.     Cardiac Cath PCI, FFR, IVUS: 2/22/21 Aparna Ho  Anatomy:   LM-short   LAD-prox 70%  Cx-mid 40%  OM- nml  RCA-dominant, mid 20%  RPDA- nml  LVEF- 60  LVG- nml  LVEDP- 10-15  FFR LAD 0.75  Intervention  ~Successful PCI to LAD with 4.0x28 AMNA to 18atm. IVUS showed under-expanded stent. Post dilated with 4.5x15 NC to 18atm. Excellent Result. Contrast: 13  Flouro Time: 21.5  Access: R radial a     Impression  ~Coronary Angiography w/ severe single vessel CAD  ~LVG with LVEF of 60 and no regional wall motion abnormalities  ~Successful complex angioplasty and stenting of LAD  ~ recommend  aspirin and plavix for 6-12 months    Stress 2/19/21  Summary  Normal LVEF >60%  Normal wall motion  Small to moderate area of inferior wall ischemia   Overall, this would be considered an abnormal, intermediate risk, study     Echo 2/19/21  Summary  Technically difficult study  Left ventricle is normal in size and function. Ejection fraction is visually estimated to be 60%. There is mild concentric left ventricular hypertrophy. No regional wall motion abnormalities are noted. Normal diastolic function. E/e' = 11.2. Trivial mitral regurgitation. Mild tricuspid regurgitation. The right ventricle is upper normal in size to mildly enlarged, function   appears low normal to mildly reduced with prominent moderator band vs apical trabeculae. Assessment/Plan  1. Coronary artery disease involving native coronary artery of native heart without angina pectoris    2. Essential hypertension    3. Mixed hyperlipidemia    4. Type 2 diabetes mellitus without complication, without long-term current use of insulin (HCC)            Coronary artery disease involving native coronary artery of native heart without angina pectoris   Angina~ ***  CCS class ***  Intervention  Stress~ 2/2021 inferior wall ischemia  LHC~ 2/2021 PCI to LAD with AMNA  Echo~ 60%  Current meds~ asa  Plan~ ***        Essential hypertension  Hypertension  There were no vitals filed for this visit.   Meds~ lisinopril 5 (decreased from 10 d/t hypoTN)  Plan~ ***        Hyperlipidemia  1/23/2023 TC 92  HDL 34 LDL 37  Meds~ Liptior  Plan~ stable, continue yearly labs with PCP ***    T2DM  6.5 A1c 1/23/2023  Meds ~ metformin   Plan ~ ***      No orders of the defined types were placed in this encounter. Follow up ***    Zahida Gallego MD      Thank you for allowing to me to participate in the care of [address] [place] [address]. Scribe's Attestation: This note was scribed in the presence of Dr. Meena Alba MD by Susannah Rodriguez, MOE    I, Dr. Meena lAba, personally performed the services described in this documentation, as scribed by the above signed scribe in my presence. It is both accurate and complete to my knowledge. I agree with the details independently gathered by the clinical support staff, while the remaining scribed note accurately describes my personal service to the patient.

## 2023-02-10 ENCOUNTER — OFFICE VISIT (OUTPATIENT)
Dept: CARDIOLOGY CLINIC | Age: 58
End: 2023-02-10
Payer: MEDICARE

## 2023-02-10 VITALS
HEART RATE: 67 BPM | OXYGEN SATURATION: 95 % | WEIGHT: 244.8 LBS | DIASTOLIC BLOOD PRESSURE: 66 MMHG | BODY MASS INDEX: 35.05 KG/M2 | HEIGHT: 70 IN | SYSTOLIC BLOOD PRESSURE: 108 MMHG

## 2023-02-10 DIAGNOSIS — I25.10 CORONARY ARTERY DISEASE INVOLVING NATIVE CORONARY ARTERY OF NATIVE HEART WITHOUT ANGINA PECTORIS: Primary | ICD-10-CM

## 2023-02-10 DIAGNOSIS — I10 ESSENTIAL HYPERTENSION: ICD-10-CM

## 2023-02-10 DIAGNOSIS — E78.2 MIXED HYPERLIPIDEMIA: ICD-10-CM

## 2023-02-10 DIAGNOSIS — E11.9 TYPE 2 DIABETES MELLITUS WITHOUT COMPLICATION, WITHOUT LONG-TERM CURRENT USE OF INSULIN (HCC): ICD-10-CM

## 2023-02-10 PROCEDURE — 99214 OFFICE O/P EST MOD 30 MIN: CPT | Performed by: INTERNAL MEDICINE

## 2023-02-10 PROCEDURE — 3074F SYST BP LT 130 MM HG: CPT | Performed by: INTERNAL MEDICINE

## 2023-02-10 PROCEDURE — 4004F PT TOBACCO SCREEN RCVD TLK: CPT | Performed by: INTERNAL MEDICINE

## 2023-02-10 PROCEDURE — 2022F DILAT RTA XM EVC RTNOPTHY: CPT | Performed by: INTERNAL MEDICINE

## 2023-02-10 PROCEDURE — 3078F DIAST BP <80 MM HG: CPT | Performed by: INTERNAL MEDICINE

## 2023-02-10 PROCEDURE — 93000 ELECTROCARDIOGRAM COMPLETE: CPT | Performed by: INTERNAL MEDICINE

## 2023-02-10 PROCEDURE — G8417 CALC BMI ABV UP PARAM F/U: HCPCS | Performed by: INTERNAL MEDICINE

## 2023-02-10 PROCEDURE — G8482 FLU IMMUNIZE ORDER/ADMIN: HCPCS | Performed by: INTERNAL MEDICINE

## 2023-02-10 PROCEDURE — 3044F HG A1C LEVEL LT 7.0%: CPT | Performed by: INTERNAL MEDICINE

## 2023-02-10 PROCEDURE — 3017F COLORECTAL CA SCREEN DOC REV: CPT | Performed by: INTERNAL MEDICINE

## 2023-02-10 PROCEDURE — G8427 DOCREV CUR MEDS BY ELIG CLIN: HCPCS | Performed by: INTERNAL MEDICINE

## 2023-02-10 NOTE — PROGRESS NOTES
[address]   Cardiac Evaluation      Patient: Balke Gan  YOB: 1965         Chief Complaint   Patient presents with    1 Year Follow Up     Pain in rib. Pt feel like he bruised his rib          Referring provider: Starla Carter MD    History of Present Illness:   Blake Gan is a 64 y.o. male here for hospital follow up for CAD. He also has Htn, Hld, DM, tobacco abuse. He is legally deaf, but able to read lips. He presented to [facility] ER 2/20/21 with chest discomfort. He had shoveled snow a few days before and had associated fatigue. He had normal troponins, abnormal stress test and was placed on heparin gtt until he was taken to the cath lab the next day where he had PCI to LAD with AMNA. Today he is here with his wife. She helps sign for him with his hearing loss. He denies chest pain and SOB. She reports that he was shoveling snow recently and he tolerated that well. Has some arm pain, but thinks it is how he sleeps. No cardiac complaints or SOB. Pt mentioned he thinks he has a bruised rib. With regard to medication therapy he/she has been compliant with prescribed regimen and has tolerated therapy to date. Past Medical History:   has a past medical history of Back pain, Congenital deafness, Deaf, Hypercholesteremia, Hyperglycemia, ADA (obstructive sleep apnea), PONV (postoperative nausea and vomiting), and Prolonged emergence from general anesthesia. Surgical History:   has a past surgical history that includes hernia repair (1994); Kidney stone surgery; and Inguinal hernia repair (7/1/15).      Current Outpatient Medications   Medication Sig Dispense Refill    lisinopril (PRINIVIL;ZESTRIL) 5 MG tablet Take 1 tablet by mouth daily 90 tablet 3    metFORMIN (GLUCOPHAGE) 500 MG tablet Take 1 tablet by mouth 2 times daily (with meals) 180 tablet 3    atorvastatin (LIPITOR) 40 MG tablet Take 1 tablet by mouth at bedtime 90 tablet 3    docusate sodium (COLACE) 100 MG capsule Take 100 mg by mouth every other day       aspirin EC 81 MG EC tablet Take 1 tablet by mouth daily 30 tablet 0     No current facility-administered medications for this visit. Allergies:  Tylenol [acetaminophen]     Social History:  Social History     Socioeconomic History    Marital status:      Spouse name: Not on file    Number of children: Not on file    Years of education: Not on file    Highest education level: Not on file   Occupational History    Not on file   Tobacco Use    Smoking status: Every Day     Packs/day: 0.50     Years: 37.00     Pack years: 18.50     Types: Cigarettes     Start date: 1985    Smokeless tobacco: Never   Vaping Use    Vaping Use: Never used    Passive vaping exposure: Yes   Substance and Sexual Activity    Alcohol use: No     Alcohol/week: 0.0 standard drinks    Drug use: No    Sexual activity: Yes   Other Topics Concern    Not on file   Social History Narrative    Not on file     Social Determinants of Health     Financial Resource Strain: Low Risk     Difficulty of Paying Living Expenses: Not hard at all   Food Insecurity: No Food Insecurity    Worried About Running Out of Food in the Last Year: Never true    [address] in the Last Year: Never true   Transportation Needs: Not on file   Physical Activity: Sufficiently Active    Days of Exercise per Week: 7 days    Minutes of Exercise per Session: 40 min   Stress: Not on file   Social Connections: Not on file   Intimate Partner Violence: Not on file   Housing Stability: Not on file       Family History:   Family History   Problem Relation Age of Onset    Cancer Other         lung    Diabetes Mother     Kidney Disease Father         39 y/o  of kidney disease     Family history has been reviewed and not pertinent except as noted above. Review of Systems:   Constitutional: there has been no unanticipated weight loss.  No change in energy or activity level   Eyes: No visual changes ENT: No Headaches, hearing loss or vertigo. No mouth sores or sore throat. Cardiovascular: Reviewed in HPI  Respiratory: No cough or wheezing, no sputum production. Gastrointestinal: No abdominal pain, appetite loss, blood in stools. No change in bowel or bladder habits. Genitourinary: No nocturia, dysuria, trouble voiding  Musculoskeletal:  No gait disturbance, weakness or joint complaints. Integumentary: No rash or pruritis. Neurological: No headache, change in muscle strength, numbness or tingling. No change in gait, balance, coordination, mood, affect, memory, mentation, behavior. Psychiatric: No anxiety or depression  Endocrine: No malaise or fever  Hematologic/Lymphatic: No abnormal bruising or bleeding, blood clots or swollen lymph nodes. Allergic/Immunologic: No nasal congestion or hives. Physical Examination:    Vitals:    02/10/23 1027   BP: 108/66   Site: Right Upper Arm   Position: Sitting   Cuff Size: Large Adult   Pulse: 67   SpO2: 95%   Weight: 244 lb 12.8 oz (111 kg)   Height: 5' 10\" (1.778 m)       Body mass index is 35.13 kg/m². Wt Readings from Last 3 Encounters:   02/10/23 244 lb 12.8 oz (111 kg)   01/23/23 245 lb (111.1 kg)   07/18/22 238 lb (108 kg)      BP Readings from Last 3 Encounters:   02/10/23 108/66   01/23/23 130/70   07/18/22 130/68        Physical Examination:    CONSTITUTIONAL: Well developed, well nourished  EYES: PERRLA. No xanthelasma, sclera non icteric  EARS,NOSE,MOUTH,THROAT:  Mucous membranes moist, normal hearing  NECK: Supple, JVP normal, thyroid not enlarged. Carotids 2+ without bruits  RESPIRATORY: Normal effort, no rales or rhonchi  CARDIOVASCULAR: Normal PMI, regular rate and rhythm, no murmurs, rub or gallop. No edema. Radial pulses present and equal  CHEST: No scar or masses  ABDOMEN: Normal bowel sounds. No masses or tenderness. No bruit  MUSCULOSKELETAL: No clubbing or cyanosis. Moves all extremities well. Normal gait  SKIN:  Warm and dry.  No rashes  NEUROLOGIC: Cranial nerves intact. Alert and oriented  PSYCHIATRIC: Calm affect. Appears to have normal judgement and insight    All testing and labs listed below were personally reviewed by myself. Cardiac Cath PCI, FFR, IVUS: 2/22/21 Dawit Laboy  Anatomy:   LM-short   LAD-prox 70%  Cx-mid 40%  OM- nml  RCA-dominant, mid 20%  RPDA- nml  LVEF- 60  LVG- nml  LVEDP- 10-15  FFR LAD 0.75  Intervention  ~Successful PCI to LAD with 4.0x28 AMNA to 18atm. IVUS showed under-expanded stent. Post dilated with 4.5x15 NC to 18atm. Excellent Result. Contrast: 13  Flouro Time: 21.5  Access: R radial a     Impression  ~Coronary Angiography w/ severe single vessel CAD  ~LVG with LVEF of 60 and no regional wall motion abnormalities  ~Successful complex angioplasty and stenting of LAD  ~ recommend  aspirin and plavix for 6-12 months    Stress 2/19/21  Summary  Normal LVEF >60%  Normal wall motion  Small to moderate area of inferior wall ischemia   Overall, this would be considered an abnormal, intermediate risk, study     Echo 2/19/21  Summary  Technically difficult study  Left ventricle is normal in size and function. Ejection fraction is visually estimated to be 60%. There is mild concentric left ventricular hypertrophy. No regional wall motion abnormalities are noted. Normal diastolic function. E/e' = 11.2. Trivial mitral regurgitation. Mild tricuspid regurgitation. The right ventricle is upper normal in size to mildly enlarged, function   appears low normal to mildly reduced with prominent moderator band vs apical trabeculae. Assessment/Plan  1. Coronary artery disease involving native coronary artery of native heart without angina pectoris    2. Essential hypertension    3. Mixed hyperlipidemia    4.  Type 2 diabetes mellitus without complication, without long-term current use of insulin (HCC)            Coronary artery disease involving native coronary artery of native heart without angina pectoris Angina~ none  CCS class 1  Intervention  Stress~ 2/2021 inferior wall ischemia  LHC~ 2/2021 PCI to LAD with AMNA  Echo~ 60%  Current meds~ asa  Plan~ Continue medications listed above, encouraged exercise        Essential hypertension  Hypertension  Vitals:    02/10/23 1027   BP: 108/66   Pulse: 67   SpO2: 95%     Meds~ lisinopril 5 (decreased from 10 d/t hypoTN)  Plan~ Continue medications listed above        Hyperlipidemia  1/23/2023 TC 92  HDL 34 LDL 37  Meds~ Liptior  Plan~ stable, continue yearly labs with PCP     T2DM  6.5 A1c 1/23/2023  Meds ~ metformin   Plan ~ Per PCP      Orders Placed This Encounter   Procedures    EKG 12 lead         Follow up with NP 1 yr    Santiago Salazar MD      Thank you for allowing to me to participate in the care of Brannon Camilo. Scribe's Attestation: This note was scribed in the presence of Dr. Ernestine Duncan MD by Annalisa Zuniga RN    I, Dr. Ernestine Duncan, personally performed the services described in this documentation, as scribed by the above signed scribe in my presence. It is both accurate and complete to my knowledge. I agree with the details independently gathered by the clinical support staff, while the remaining scribed note accurately describes my personal service to the patient.

## 2023-06-23 DIAGNOSIS — I10 ESSENTIAL HYPERTENSION: ICD-10-CM

## 2023-06-23 RX ORDER — LISINOPRIL 5 MG/1
5 TABLET ORAL DAILY
Qty: 90 TABLET | Refills: 3 | Status: SHIPPED | OUTPATIENT
Start: 2023-06-23

## 2023-06-23 NOTE — TELEPHONE ENCOUNTER
Medication Refill    Medication needing refilled:  lisinopril (PRINIVIL;ZESTRIL) 5 MG- PT ONLY HAS ONE DOSE LEFT    Dosage of the medication:    How are you taking this medication (QD, BID, TID, QID, PRN):1 tablet by mouth daily    30 or 90 day supply called in: 90 day supply    When will you run out of your medication:    Which Pharmacy are we sending the medication to?: [facility] [phone]

## 2023-06-23 NOTE — TELEPHONE ENCOUNTER
Last OV: 2/10/23 psc  Last Labs: 4/18/22 bmp  Last refill:1/23/23  Next appt:  2/10/24 npts (on recall list)

## 2023-07-01 DIAGNOSIS — E11.65 TYPE 2 DIABETES MELLITUS WITH HYPERGLYCEMIA, WITHOUT LONG-TERM CURRENT USE OF INSULIN (HCC): ICD-10-CM

## 2023-07-03 NOTE — TELEPHONE ENCOUNTER
Last OV: 1/23/2023  Next OV: 8/9/2023    Next appointment w/ Dr. Nicky Venegas    Last fill:4/5/2023

## 2023-08-10 ENCOUNTER — OFFICE VISIT (OUTPATIENT)
Dept: INTERNAL MEDICINE CLINIC | Age: 58
End: 2023-08-10

## 2023-08-10 VITALS
SYSTOLIC BLOOD PRESSURE: 120 MMHG | BODY MASS INDEX: 33.21 KG/M2 | HEIGHT: 70 IN | WEIGHT: 232 LBS | OXYGEN SATURATION: 95 % | HEART RATE: 87 BPM | DIASTOLIC BLOOD PRESSURE: 70 MMHG

## 2023-08-10 DIAGNOSIS — K40.90 LEFT INGUINAL HERNIA: ICD-10-CM

## 2023-08-10 DIAGNOSIS — E11.65 TYPE 2 DIABETES MELLITUS WITH HYPERGLYCEMIA, WITHOUT LONG-TERM CURRENT USE OF INSULIN (HCC): ICD-10-CM

## 2023-08-10 DIAGNOSIS — I10 ESSENTIAL HYPERTENSION: ICD-10-CM

## 2023-08-10 DIAGNOSIS — E78.2 MIXED HYPERLIPIDEMIA: Primary | ICD-10-CM

## 2023-08-10 DIAGNOSIS — Z12.11 COLON CANCER SCREENING: ICD-10-CM

## 2023-08-10 DIAGNOSIS — Z00.00 MEDICARE ANNUAL WELLNESS VISIT, SUBSEQUENT: ICD-10-CM

## 2023-08-10 DIAGNOSIS — I25.10 CORONARY ARTERY DISEASE INVOLVING NATIVE CORONARY ARTERY OF NATIVE HEART WITHOUT ANGINA PECTORIS: ICD-10-CM

## 2023-08-10 PROBLEM — R73.09 ABNORMAL GLUCOSE: Status: RESOLVED | Noted: 2017-12-12 | Resolved: 2023-08-10

## 2023-08-10 SDOH — ECONOMIC STABILITY: FOOD INSECURITY: WITHIN THE PAST 12 MONTHS, THE FOOD YOU BOUGHT JUST DIDN'T LAST AND YOU DIDN'T HAVE MONEY TO GET MORE.: NEVER TRUE

## 2023-08-10 SDOH — ECONOMIC STABILITY: HOUSING INSECURITY
IN THE LAST 12 MONTHS, WAS THERE A TIME WHEN YOU DID NOT HAVE A STEADY PLACE TO SLEEP OR SLEPT IN A SHELTER (INCLUDING NOW)?: NO

## 2023-08-10 SDOH — ECONOMIC STABILITY: INCOME INSECURITY: HOW HARD IS IT FOR YOU TO PAY FOR THE VERY BASICS LIKE FOOD, HOUSING, MEDICAL CARE, AND HEATING?: SOMEWHAT HARD

## 2023-08-10 SDOH — ECONOMIC STABILITY: FOOD INSECURITY: WITHIN THE PAST 12 MONTHS, YOU WORRIED THAT YOUR FOOD WOULD RUN OUT BEFORE YOU GOT MONEY TO BUY MORE.: NEVER TRUE

## 2023-08-10 ASSESSMENT — ENCOUNTER SYMPTOMS
CHEST TIGHTNESS: 0
SINUS PAIN: 0
CONSTIPATION: 0
VISUAL CHANGE: 0
WHEEZING: 0
COUGH: 0
COLOR CHANGE: 0
BLOOD IN STOOL: 0
SHORTNESS OF BREATH: 0
ABDOMINAL PAIN: 0

## 2023-08-10 ASSESSMENT — PATIENT HEALTH QUESTIONNAIRE - PHQ9
1. LITTLE INTEREST OR PLEASURE IN DOING THINGS: 0
SUM OF ALL RESPONSES TO PHQ QUESTIONS 1-9: 0
2. FEELING DOWN, DEPRESSED OR HOPELESS: 0
SUM OF ALL RESPONSES TO PHQ QUESTIONS 1-9: 0
SUM OF ALL RESPONSES TO PHQ9 QUESTIONS 1 & 2: 0

## 2023-08-10 ASSESSMENT — LIFESTYLE VARIABLES: HOW OFTEN DO YOU HAVE A DRINK CONTAINING ALCOHOL: NEVER

## 2023-08-10 NOTE — PROGRESS NOTES
PCP - General (Internal Medicine)  Aliyah Kennedy MD as PCP - Empaneled Provider  Micha Frances MD as Consulting Physician (General Surgery)     Reviewed and updated this visit:  Tobacco  Allergies  Meds  Problems  Med Hx  Surg Hx  Soc Hx  Fam Hx

## 2023-08-11 DIAGNOSIS — E11.65 TYPE 2 DIABETES MELLITUS WITH HYPERGLYCEMIA, WITHOUT LONG-TERM CURRENT USE OF INSULIN (HCC): ICD-10-CM

## 2023-08-11 DIAGNOSIS — E78.2 MIXED HYPERLIPIDEMIA: ICD-10-CM

## 2023-08-11 DIAGNOSIS — I10 ESSENTIAL HYPERTENSION: ICD-10-CM

## 2023-08-11 LAB
ALBUMIN SERPL-MCNC: 4.6 G/DL (ref 3.4–5)
ALBUMIN/GLOB SERPL: 2 {RATIO} (ref 1.1–2.2)
ALP SERPL-CCNC: 82 U/L (ref 40–129)
ALT SERPL-CCNC: 17 U/L (ref 10–40)
ANION GAP SERPL CALCULATED.3IONS-SCNC: 12 MMOL/L (ref 3–16)
AST SERPL-CCNC: 18 U/L (ref 15–37)
BASOPHILS # BLD: 0.1 K/UL (ref 0–0.2)
BASOPHILS NFR BLD: 1.1 %
BILIRUB SERPL-MCNC: 0.7 MG/DL (ref 0–1)
BUN SERPL-MCNC: 27 MG/DL (ref 7–20)
CALCIUM SERPL-MCNC: 9.3 MG/DL (ref 8.3–10.6)
CHLORIDE SERPL-SCNC: 102 MMOL/L (ref 99–110)
CO2 SERPL-SCNC: 25 MMOL/L (ref 21–32)
CREAT SERPL-MCNC: 0.9 MG/DL (ref 0.9–1.3)
CREAT UR-MCNC: 162.5 MG/DL (ref 39–259)
DEPRECATED RDW RBC AUTO: 13.4 % (ref 12.4–15.4)
EOSINOPHIL # BLD: 0.4 K/UL (ref 0–0.6)
EOSINOPHIL NFR BLD: 3.5 %
GFR SERPLBLD CREATININE-BSD FMLA CKD-EPI: >60 ML/MIN/{1.73_M2}
GLUCOSE SERPL-MCNC: 120 MG/DL (ref 70–99)
HCT VFR BLD AUTO: 42.3 % (ref 40.5–52.5)
HGB BLD-MCNC: 14.8 G/DL (ref 13.5–17.5)
LYMPHOCYTES # BLD: 2.8 K/UL (ref 1–5.1)
LYMPHOCYTES NFR BLD: 25.9 %
MCH RBC QN AUTO: 32.8 PG (ref 26–34)
MCHC RBC AUTO-ENTMCNC: 35 G/DL (ref 31–36)
MCV RBC AUTO: 93.7 FL (ref 80–100)
MICROALBUMIN UR DL<=1MG/L-MCNC: 1.8 MG/DL
MICROALBUMIN/CREAT UR: 11.1 MG/G (ref 0–30)
MONOCYTES # BLD: 0.7 K/UL (ref 0–1.3)
MONOCYTES NFR BLD: 6.6 %
NEUTROPHILS # BLD: 6.8 K/UL (ref 1.7–7.7)
NEUTROPHILS NFR BLD: 62.9 %
PLATELET # BLD AUTO: 243 K/UL (ref 135–450)
PMV BLD AUTO: 10.1 FL (ref 5–10.5)
POTASSIUM SERPL-SCNC: 4.4 MMOL/L (ref 3.5–5.1)
PROT SERPL-MCNC: 6.9 G/DL (ref 6.4–8.2)
RBC # BLD AUTO: 4.52 M/UL (ref 4.2–5.9)
SODIUM SERPL-SCNC: 139 MMOL/L (ref 136–145)
TSH SERPL DL<=0.005 MIU/L-ACNC: 1.06 UIU/ML (ref 0.27–4.2)
WBC # BLD AUTO: 10.7 K/UL (ref 4–11)

## 2023-08-12 LAB
EST. AVERAGE GLUCOSE BLD GHB EST-MCNC: 122.6 MG/DL
HBA1C MFR BLD: 5.9 %

## 2023-08-23 ENCOUNTER — OFFICE VISIT (OUTPATIENT)
Dept: SURGERY | Age: 58
End: 2023-08-23

## 2023-08-23 VITALS
SYSTOLIC BLOOD PRESSURE: 126 MMHG | DIASTOLIC BLOOD PRESSURE: 68 MMHG | WEIGHT: 230.8 LBS | HEIGHT: 70 IN | BODY MASS INDEX: 33.04 KG/M2

## 2023-08-23 DIAGNOSIS — R10.32 LEFT GROIN PAIN: Primary | ICD-10-CM

## 2023-08-23 ASSESSMENT — ENCOUNTER SYMPTOMS
ABDOMINAL DISTENTION: 0
APNEA: 0
BACK PAIN: 0
EYE ITCHING: 0
COLOR CHANGE: 0
CHEST TIGHTNESS: 0
ABDOMINAL PAIN: 1
EYE DISCHARGE: 0

## 2023-08-23 NOTE — PROGRESS NOTES
:     Mervat Singletary is a 62 y.o. male     CC: Left groin pain    HPI: 55-year-old male with a history of bilateral inguinal hernia repairs with mesh in 1994 and then repair of a recurrent right inguinal hernia 1995. He presents now with a 1 month history of left groin pain. The pain worsens throughout the day and is moderately severe by the evening. It is worse with certain body positions. Nothing makes it better. Associated nausea, vomiting, anorexia, unexpected weight loss, fevers, chills, change in bowel habits or urinary symptoms. Past Medical History:   Diagnosis Date    Back pain     Congenital deafness     Deaf     Hypercholesteremia     Hyperglycemia     ADA (obstructive sleep apnea)     PONV (postoperative nausea and vomiting)     Prolonged emergence from general anesthesia        Tylenol [acetaminophen]     Past Surgical History:   Procedure Laterality Date    HERNIA REPAIR  1994    x2    INGUINAL HERNIA REPAIR  7/1/15    mff    KIDNEY STONE SURGERY          Prior to Visit Medications    Medication Sig Taking?  Authorizing Provider   metFORMIN (GLUCOPHAGE) 500 MG tablet TAKE ONE TABLET BY MOUTH TWICE DAILY WITH A MEAL Yes Rena Hagan MD   lisinopril (PRINIVIL;ZESTRIL) 5 MG tablet Take 1 tablet by mouth daily Yes Jax Samuel MD   atorvastatin (LIPITOR) 40 MG tablet Take 1 tablet by mouth at bedtime Yes Jax Samuel MD   docusate sodium (COLACE) 100 MG capsule Take 1 capsule by mouth every other day Yes Historical Provider, MD   aspirin EC 81 MG EC tablet Take 1 tablet by mouth daily Yes Lalitha Randall MD       Social History     Socioeconomic History    Marital status:      Spouse name: Not on file    Number of children: Not on file    Years of education: Not on file    Highest education level: Not on file   Occupational History    Not on file   Tobacco Use    Smoking status: Every Day     Packs/day: 0.50     Years: 37.00     Pack years: 18.50     Types: Cigarettes     Start date:

## 2023-10-25 ENCOUNTER — NURSE ONLY (OUTPATIENT)
Dept: INTERNAL MEDICINE CLINIC | Age: 58
End: 2023-10-25

## 2023-10-25 DIAGNOSIS — Z23 FLU VACCINE NEED: Primary | ICD-10-CM

## 2023-11-28 NOTE — PROGRESS NOTES
Patient ___ reached   _X____not reached-preop instructions left on voice [id number]___[phone]__________      DATE___12/4/23_____ TIME__1000______ARRIVAL__0830  FEC_______      Nothing to eat or drink after midnight the night before,except for what the prep instructions call for. If you do not have the instructions or do not understand them please contact your doctors office. Follow any instructions your doctors office has given you including what medications to take the AM of your procedure and which ones to hold. You may use your inhalers - bring rescue inhalers with you DOS. If you take a long acting insulin the jerson prior please cut the dose in half and take no diabetic medications that AM.Follow specific doctors office instructions regarding blood thinners and if they want you to hold and for how long. If you are on a blood thinner and have no instructions please contact the office and ask. Dress comfortably,bring your insurance card,picture ID,and a complete list of medications, including supplements. You must have a responsible adult to stay with you during the procedure,drive you home and stay with you. [facility] phone number [phone] for any questions. OTHER INSTRUCTIONS(if applicable)_________________________________________________________        VISITOR POLICY(subject to change)    Current visitor policy is 2 visitors per patient. No children allowed. Mask at discretion of facility. Visiting hours are 8a-8p. Overnight visitors will be at the discretion of the nurse. All policies are subject to change.

## 2023-12-04 ENCOUNTER — ANESTHESIA EVENT (OUTPATIENT)
Dept: ENDOSCOPY | Age: 58
End: 2023-12-04
Payer: MEDICARE

## 2023-12-04 ENCOUNTER — ANESTHESIA (OUTPATIENT)
Dept: ENDOSCOPY | Age: 58
End: 2023-12-04
Payer: MEDICARE

## 2023-12-04 ENCOUNTER — HOSPITAL ENCOUNTER (OUTPATIENT)
Age: 58
Setting detail: OUTPATIENT SURGERY
Discharge: HOME HEALTH | End: 2023-12-04
Attending: INTERNAL MEDICINE | Admitting: INTERNAL MEDICINE
Payer: MEDICARE

## 2023-12-04 VITALS
BODY MASS INDEX: 33.64 KG/M2 | HEART RATE: 58 BPM | RESPIRATION RATE: 20 BRPM | OXYGEN SATURATION: 97 % | DIASTOLIC BLOOD PRESSURE: 63 MMHG | WEIGHT: 235 LBS | TEMPERATURE: 98.2 F | SYSTOLIC BLOOD PRESSURE: 109 MMHG | HEIGHT: 70 IN

## 2023-12-04 DIAGNOSIS — Z12.11 COLON CANCER SCREENING: ICD-10-CM

## 2023-12-04 LAB
GLUCOSE BLD-MCNC: 124 MG/DL (ref 70–99)
GLUCOSE BLD-MCNC: 99 MG/DL (ref 70–99)
PERFORMED ON: ABNORMAL
PERFORMED ON: NORMAL

## 2023-12-04 PROCEDURE — 3700000000 HC ANESTHESIA ATTENDED CARE: Performed by: INTERNAL MEDICINE

## 2023-12-04 PROCEDURE — 7100000011 HC PHASE II RECOVERY - ADDTL 15 MIN: Performed by: INTERNAL MEDICINE

## 2023-12-04 PROCEDURE — 2500000003 HC RX 250 WO HCPCS: Performed by: NURSE ANESTHETIST, CERTIFIED REGISTERED

## 2023-12-04 PROCEDURE — 7100000010 HC PHASE II RECOVERY - FIRST 15 MIN: Performed by: INTERNAL MEDICINE

## 2023-12-04 PROCEDURE — 2580000003 HC RX 258: Performed by: NURSE ANESTHETIST, CERTIFIED REGISTERED

## 2023-12-04 PROCEDURE — 3609010600 HC COLONOSCOPY POLYPECTOMY SNARE/COLD BIOPSY: Performed by: INTERNAL MEDICINE

## 2023-12-04 PROCEDURE — 3700000001 HC ADD 15 MINUTES (ANESTHESIA): Performed by: INTERNAL MEDICINE

## 2023-12-04 PROCEDURE — 6360000002 HC RX W HCPCS: Performed by: NURSE ANESTHETIST, CERTIFIED REGISTERED

## 2023-12-04 PROCEDURE — 2709999900 HC NON-CHARGEABLE SUPPLY: Performed by: INTERNAL MEDICINE

## 2023-12-04 PROCEDURE — 88305 TISSUE EXAM BY PATHOLOGIST: CPT

## 2023-12-04 RX ORDER — SODIUM CHLORIDE 9 MG/ML
INJECTION, SOLUTION INTRAVENOUS CONTINUOUS PRN
Status: DISCONTINUED | OUTPATIENT
Start: 2023-12-04 | End: 2023-12-04 | Stop reason: SDUPTHER

## 2023-12-04 RX ORDER — PROPOFOL 10 MG/ML
INJECTION, EMULSION INTRAVENOUS PRN
Status: DISCONTINUED | OUTPATIENT
Start: 2023-12-04 | End: 2023-12-04 | Stop reason: SDUPTHER

## 2023-12-04 RX ORDER — LIDOCAINE HYDROCHLORIDE 20 MG/ML
INJECTION, SOLUTION EPIDURAL; INFILTRATION; INTRACAUDAL; PERINEURAL PRN
Status: DISCONTINUED | OUTPATIENT
Start: 2023-12-04 | End: 2023-12-04 | Stop reason: SDUPTHER

## 2023-12-04 RX ADMIN — SODIUM CHLORIDE: 9 INJECTION, SOLUTION INTRAVENOUS at 10:14

## 2023-12-04 RX ADMIN — LIDOCAINE HYDROCHLORIDE 100 MG: 20 INJECTION, SOLUTION EPIDURAL; INFILTRATION; INTRACAUDAL; PERINEURAL at 10:19

## 2023-12-04 RX ADMIN — PROPOFOL 100 MG: 10 INJECTION, EMULSION INTRAVENOUS at 10:19

## 2023-12-04 RX ADMIN — PROPOFOL 100 MCG/KG/MIN: 10 INJECTION, EMULSION INTRAVENOUS at 10:20

## 2023-12-04 ASSESSMENT — PAIN SCALES - GENERAL: PAINLEVEL_OUTOF10: 0

## 2023-12-04 ASSESSMENT — PAIN - FUNCTIONAL ASSESSMENT: PAIN_FUNCTIONAL_ASSESSMENT: 0-10

## 2023-12-04 NOTE — ANESTHESIA PRE PROCEDURE
Department of Anesthesiology  Preprocedure Note       Name:  Dung Peguero   Age:  62 y.o.  :  1965                                          MRN:  [id number]         Date:  2023      Surgeon: Chavo Costa):  Chintan Estrada MD    Procedure: Procedure(s):  COLONOSCOPY DIAGNOSTIC    Medications prior to admission:   Prior to Admission medications    Medication Sig Start Date End Date Taking? Authorizing Provider   metFORMIN (GLUCOPHAGE) 500 MG tablet TAKE ONE TABLET BY MOUTH TWICE DAILY WITH A MEAL 23   Mikey Bergman MD   lisinopril (PRINIVIL;ZESTRIL) 5 MG tablet Take 1 tablet by mouth daily 23   Renetta Villarreal MD   atorvastatin (LIPITOR) 40 MG tablet Take 1 tablet by mouth at bedtime 22   Renetta Villarreal MD   docusate sodium (COLACE) 100 MG capsule Take 1 capsule by mouth every other day    ProviderAlvin MD   aspirin EC 81 MG EC tablet Take 1 tablet by mouth daily 16   Elsa Munguia MD       Current medications:    No current facility-administered medications for this encounter. Allergies:     Allergies   Allergen Reactions    Tylenol [Acetaminophen] Shortness Of Breath     Tylenol 3, makes him feel like hes having a heart attack, however can take Vicodin       Problem List:    Patient Active Problem List   Diagnosis Code    Essential hypertension I10    Central hearing loss H90.5    Sleep apnea G47.30    Recurrent right inguinal hernia K40.91    Ventral hernia K43.9    Hyperlipidemia E78.5    Coronary artery disease involving native coronary artery of native heart without angina pectoris I25.10    T2DM (type 2 diabetes mellitus) (HCC) E11.9    Calculus of kidney N20.0    Impotence of organic origin N52.9       Past Medical History:        Diagnosis Date    Back pain     Congenital deafness     Deaf     Hypercholesteremia     Hyperglycemia     ADA (obstructive sleep apnea)     PONV (postoperative nausea and vomiting)     Prolonged emergence from general anesthesia

## 2023-12-04 NOTE — ANESTHESIA POSTPROCEDURE EVALUATION
Department of Anesthesiology  Postprocedure Note    Patient: Gwendloyn Bosworth  MRN: [id number]  YOB: 1965  Date of evaluation: 12/4/2023      Procedure Summary       Date: 12/04/23 Room / Location: [address] [place] [address]    Anesthesia Start: 0014 Anesthesia Stop: 9572    Procedure: COLONOSCOPY POLYPECTOMY SNARE/COLD BIOPSY Diagnosis:       Colon cancer screening      (Colon cancer screening [Z12.11])    Surgeons: Eladia Gallego MD Responsible Provider: Denisa Montoya MD    Anesthesia Type: MAC ASA Status: 3            Anesthesia Type: No value filed.     Cornelius Phase I:      Cornelius Phase II:        Anesthesia Post Evaluation    Patient location during evaluation: PACU  Level of consciousness: awake  Airway patency: patent  Nausea & Vomiting: no nausea and no vomiting  Complications: no  Cardiovascular status: hemodynamically stable  Respiratory status: acceptable  Hydration status: stable  Pain management: satisfactory to patient

## 2023-12-04 NOTE — DISCHARGE INSTRUCTIONS

## 2024-01-12 ENCOUNTER — OFFICE VISIT (OUTPATIENT)
Dept: INTERNAL MEDICINE CLINIC | Age: 59
End: 2024-01-12

## 2024-01-12 VITALS
HEART RATE: 86 BPM | BODY MASS INDEX: 32.99 KG/M2 | WEIGHT: 230.4 LBS | OXYGEN SATURATION: 95 % | SYSTOLIC BLOOD PRESSURE: 104 MMHG | HEIGHT: 70 IN | DIASTOLIC BLOOD PRESSURE: 70 MMHG

## 2024-01-12 DIAGNOSIS — E11.65 TYPE 2 DIABETES MELLITUS WITH HYPERGLYCEMIA, WITHOUT LONG-TERM CURRENT USE OF INSULIN (HCC): ICD-10-CM

## 2024-01-12 DIAGNOSIS — M72.2 PLANTAR FASCIITIS: Primary | ICD-10-CM

## 2024-01-12 DIAGNOSIS — M77.31 CALCANEAL SPUR OF RIGHT FOOT: ICD-10-CM

## 2024-01-12 DIAGNOSIS — E78.2 MIXED HYPERLIPIDEMIA: ICD-10-CM

## 2024-01-12 RX ORDER — ATORVASTATIN CALCIUM 40 MG/1
40 TABLET, FILM COATED ORAL NIGHTLY
Qty: 90 TABLET | Refills: 3 | Status: SHIPPED | OUTPATIENT
Start: 2024-01-12

## 2024-01-12 RX ORDER — PREDNISONE 10 MG/1
10 TABLET ORAL 2 TIMES DAILY
Qty: 10 TABLET | Refills: 0 | Status: SHIPPED | OUTPATIENT
Start: 2024-01-12 | End: 2024-01-17

## 2024-01-12 ASSESSMENT — ENCOUNTER SYMPTOMS
CHEST TIGHTNESS: 0
COUGH: 0
ABDOMINAL PAIN: 0
BLURRED VISION: 0
COLOR CHANGE: 0
WHEEZING: 0
SINUS PAIN: 0
CONSTIPATION: 0
BLOOD IN STOOL: 0
SHORTNESS OF BREATH: 0
VISUAL CHANGE: 0

## 2024-01-12 NOTE — PROGRESS NOTES
Shad Britton (:  1965) is a 58 y.o. male,Established patient, here for evaluation of the following chief complaint(s):  Foot Pain (R )         ASSESSMENT/PLAN:  1. Plantar fasciitis  -     atorvastatin (LIPITOR) 40 MG tablet; Take 1 tablet by mouth at bedtime, Disp-90 tablet, R-3Normal  -     predniSONE (DELTASONE) 10 MG tablet; Take 1 tablet by mouth 2 times daily for 5 days, Disp-10 tablet, R-0Normal  -     diclofenac sodium (VOLTAREN) 1 % GEL; Apply 4 g topically 4 times daily, Topical, 4 TIMES DAILY Starting 2024, Disp-100 g, R-2, Normal  2. Type 2 diabetes mellitus with hyperglycemia, without long-term current use of insulin (HCC)  3. Calcaneal spur of right foot  4. Mixed hyperlipidemia  Reviewed with the patient her current medical issues and at this point he seems to be having plantar fasciitis with possible heel spur we can conservatively treat that as outlined above it does not improve we can go ahead and give him a cortisone injection    Patient blood sugar has been well-controlled he is aware that the prednisone can increase his sugar and he is going to keep an eye on it while he is taking it    Patient needs a refill on his antihyperlipidemic agent he did not have any issues with it and had not any side effects prescription has been sent to his pharmacy    No follow-ups on file.         Subjective   SUBJECTIVE/OBJECTIVE:    Lab Review   Lab Results   Component Value Date/Time     2023 07:54 AM     2023 10:58 AM     2022 08:27 AM    K 4.4 2023 07:54 AM    K 4.7 2023 10:58 AM    K 4.3 2022 08:27 AM    K 4.8 2021 05:35 AM    CO2 25 2023 07:54 AM    CO2 25 2023 10:58 AM    CO2 26 2022 08:27 AM    BUN 27 2023 07:54 AM    BUN 19 2023 10:58 AM    BUN 17 2022 08:27 AM    CREATININE 0.9 2023 07:54 AM    CREATININE 0.9 2023 10:58 AM    CREATININE 0.9 2022 08:27 AM    GLUCOSE 120

## 2024-02-06 ENCOUNTER — OFFICE VISIT (OUTPATIENT)
Dept: CARDIOLOGY CLINIC | Age: 59
End: 2024-02-06
Payer: MEDICARE

## 2024-02-06 VITALS
DIASTOLIC BLOOD PRESSURE: 62 MMHG | OXYGEN SATURATION: 95 % | HEIGHT: 70 IN | HEART RATE: 70 BPM | BODY MASS INDEX: 33.93 KG/M2 | WEIGHT: 237 LBS | SYSTOLIC BLOOD PRESSURE: 112 MMHG

## 2024-02-06 DIAGNOSIS — I10 ESSENTIAL HYPERTENSION: ICD-10-CM

## 2024-02-06 DIAGNOSIS — E78.2 MIXED HYPERLIPIDEMIA: ICD-10-CM

## 2024-02-06 DIAGNOSIS — I25.10 CORONARY ARTERY DISEASE INVOLVING NATIVE CORONARY ARTERY OF NATIVE HEART WITHOUT ANGINA PECTORIS: Primary | ICD-10-CM

## 2024-02-06 PROCEDURE — 3074F SYST BP LT 130 MM HG: CPT | Performed by: NURSE PRACTITIONER

## 2024-02-06 PROCEDURE — 3078F DIAST BP <80 MM HG: CPT | Performed by: NURSE PRACTITIONER

## 2024-02-06 PROCEDURE — 99214 OFFICE O/P EST MOD 30 MIN: CPT | Performed by: NURSE PRACTITIONER

## 2024-02-06 PROCEDURE — G8482 FLU IMMUNIZE ORDER/ADMIN: HCPCS | Performed by: NURSE PRACTITIONER

## 2024-02-06 PROCEDURE — G8417 CALC BMI ABV UP PARAM F/U: HCPCS | Performed by: NURSE PRACTITIONER

## 2024-02-06 PROCEDURE — G8427 DOCREV CUR MEDS BY ELIG CLIN: HCPCS | Performed by: NURSE PRACTITIONER

## 2024-02-06 PROCEDURE — 3017F COLORECTAL CA SCREEN DOC REV: CPT | Performed by: NURSE PRACTITIONER

## 2024-02-06 PROCEDURE — 4004F PT TOBACCO SCREEN RCVD TLK: CPT | Performed by: NURSE PRACTITIONER

## 2024-02-06 NOTE — PROGRESS NOTES
[facility]     Outpatient Follow Up Note    Shad Britton is 58 y.o. male who presents today with a history of CAD s/p PTCA LAD Feb '21; HTN and hyperlipidemia.        CHIEF COMPLAINT / HPI:  Follow Up secondary to coronary artery disease    Subjective:   He denies significant chest pain. There is no SOB/RIDDLE. The patient denies orthopnea/PND. The patient does not have swelling. The patients weight is down 7# / year by office scales . The patient is not experiencing palpitations or dizziness.     These symptoms show no change since the last OV.   With regard to medication therapy the patient has been compliant with prescribed regimen. They have tolerated therapy to date.     Past Medical History:   Diagnosis Date    Back pain     Congenital deafness     Deaf     Hypercholesteremia     Hyperglycemia     ADA (obstructive sleep apnea)     PONV (postoperative nausea and vomiting)     Prolonged emergence from general anesthesia      Social History:    Social History     Tobacco Use   Smoking Status Every Day    Current packs/day: 0.50    Average packs/day: 0.5 packs/day for 38.6 years (19.3 ttl pk-yrs)    Types: Cigarettes    Start date: 6/26/1985   Smokeless Tobacco Never     Current Medications:  Current Outpatient Medications   Medication Sig Dispense Refill    atorvastatin (LIPITOR) 40 MG tablet Take 1 tablet by mouth at bedtime 90 tablet 3    metFORMIN (GLUCOPHAGE) 500 MG tablet TAKE ONE TABLET BY MOUTH TWICE DAILY WITH A MEAL 180 tablet 3    lisinopril (PRINIVIL;ZESTRIL) 5 MG tablet Take 1 tablet by mouth daily 90 tablet 3    docusate sodium (COLACE) 100 MG capsule Take 1 capsule by mouth every other day      aspirin EC 81 MG EC tablet Take 1 tablet by mouth daily 30 tablet 0    diclofenac sodium (VOLTAREN) 1 % GEL Apply 4 g topically 4 times daily (Patient not taking: Reported on 2/6/2024) 100 g 2    albuterol sulfate HFA (VENTOLIN HFA) 108 (90 Base) MCG/ACT inhaler Inhale 1-2 puffs into the lungs

## 2024-02-08 ENCOUNTER — OFFICE VISIT (OUTPATIENT)
Dept: INTERNAL MEDICINE CLINIC | Age: 59
End: 2024-02-08

## 2024-02-08 VITALS
HEIGHT: 70 IN | DIASTOLIC BLOOD PRESSURE: 70 MMHG | OXYGEN SATURATION: 96 % | SYSTOLIC BLOOD PRESSURE: 128 MMHG | HEART RATE: 62 BPM | WEIGHT: 237.6 LBS | BODY MASS INDEX: 34.01 KG/M2

## 2024-02-08 DIAGNOSIS — R35.0 FREQUENCY OF MICTURITION: ICD-10-CM

## 2024-02-08 DIAGNOSIS — E11.65 TYPE 2 DIABETES MELLITUS WITH HYPERGLYCEMIA, WITHOUT LONG-TERM CURRENT USE OF INSULIN (HCC): ICD-10-CM

## 2024-02-08 DIAGNOSIS — I25.10 CORONARY ARTERY DISEASE INVOLVING NATIVE CORONARY ARTERY OF NATIVE HEART WITHOUT ANGINA PECTORIS: Primary | ICD-10-CM

## 2024-02-08 DIAGNOSIS — E78.2 MIXED HYPERLIPIDEMIA: ICD-10-CM

## 2024-02-08 DIAGNOSIS — I10 ESSENTIAL HYPERTENSION: ICD-10-CM

## 2024-02-08 LAB
ALBUMIN SERPL-MCNC: 4.7 G/DL (ref 3.4–5)
ALBUMIN/GLOB SERPL: 2.1 {RATIO} (ref 1.1–2.2)
ALP SERPL-CCNC: 72 U/L (ref 40–129)
ALT SERPL-CCNC: 20 U/L (ref 10–40)
ANION GAP SERPL CALCULATED.3IONS-SCNC: 13 MMOL/L (ref 3–16)
AST SERPL-CCNC: 19 U/L (ref 15–37)
BASOPHILS # BLD: 0.1 K/UL (ref 0–0.2)
BASOPHILS NFR BLD: 0.7 %
BILIRUB SERPL-MCNC: 0.7 MG/DL (ref 0–1)
BUN SERPL-MCNC: 23 MG/DL (ref 7–20)
CALCIUM SERPL-MCNC: 9.6 MG/DL (ref 8.3–10.6)
CHLORIDE SERPL-SCNC: 102 MMOL/L (ref 99–110)
CHOLEST SERPL-MCNC: 90 MG/DL (ref 0–199)
CO2 SERPL-SCNC: 24 MMOL/L (ref 21–32)
CREAT SERPL-MCNC: 0.9 MG/DL (ref 0.9–1.3)
DEPRECATED RDW RBC AUTO: 13.2 % (ref 12.4–15.4)
EOSINOPHIL # BLD: 0.6 K/UL (ref 0–0.6)
EOSINOPHIL NFR BLD: 5.1 %
GFR SERPLBLD CREATININE-BSD FMLA CKD-EPI: >60 ML/MIN/{1.73_M2}
GLUCOSE SERPL-MCNC: 106 MG/DL (ref 70–99)
HCT VFR BLD AUTO: 45.7 % (ref 40.5–52.5)
HDLC SERPL-MCNC: 39 MG/DL (ref 40–60)
HGB BLD-MCNC: 15.3 G/DL (ref 13.5–17.5)
LDLC SERPL CALC-MCNC: 34 MG/DL
LYMPHOCYTES # BLD: 3.2 K/UL (ref 1–5.1)
LYMPHOCYTES NFR BLD: 28 %
MCH RBC QN AUTO: 31.5 PG (ref 26–34)
MCHC RBC AUTO-ENTMCNC: 33.5 G/DL (ref 31–36)
MCV RBC AUTO: 93.8 FL (ref 80–100)
MONOCYTES # BLD: 0.7 K/UL (ref 0–1.3)
MONOCYTES NFR BLD: 6.5 %
NEUTROPHILS # BLD: 6.8 K/UL (ref 1.7–7.7)
NEUTROPHILS NFR BLD: 59.7 %
PLATELET # BLD AUTO: 251 K/UL (ref 135–450)
PMV BLD AUTO: 10.1 FL (ref 5–10.5)
POTASSIUM SERPL-SCNC: 4.7 MMOL/L (ref 3.5–5.1)
PROT SERPL-MCNC: 6.9 G/DL (ref 6.4–8.2)
PSA SERPL DL<=0.01 NG/ML-MCNC: 1.19 NG/ML (ref 0–4)
RBC # BLD AUTO: 4.87 M/UL (ref 4.2–5.9)
SODIUM SERPL-SCNC: 139 MMOL/L (ref 136–145)
TRIGL SERPL-MCNC: 84 MG/DL (ref 0–150)
TSH SERPL DL<=0.005 MIU/L-ACNC: 1.21 UIU/ML (ref 0.27–4.2)
VLDLC SERPL CALC-MCNC: 17 MG/DL
WBC # BLD AUTO: 11.4 K/UL (ref 4–11)

## 2024-02-08 ASSESSMENT — ENCOUNTER SYMPTOMS
CHEST TIGHTNESS: 0
COLOR CHANGE: 0
WHEEZING: 0
SINUS PAIN: 0
ABDOMINAL PAIN: 0
BLURRED VISION: 0
COUGH: 0
SHORTNESS OF BREATH: 0
BLOOD IN STOOL: 0
CONSTIPATION: 0

## 2024-02-08 ASSESSMENT — PATIENT HEALTH QUESTIONNAIRE - PHQ9
1. LITTLE INTEREST OR PLEASURE IN DOING THINGS: 1
SUM OF ALL RESPONSES TO PHQ9 QUESTIONS 1 & 2: 1
2. FEELING DOWN, DEPRESSED OR HOPELESS: 0
SUM OF ALL RESPONSES TO PHQ QUESTIONS 1-9: 1

## 2024-02-08 NOTE — PROGRESS NOTES
Shad Britton (:  1965) is a 58 y.o. male,Established patient, here for evaluation of the following chief complaint(s):  6 Month Follow-Up         ASSESSMENT/PLAN:  1. Coronary artery disease involving native coronary artery of native heart without angina pectoris  2. Essential hypertension  -     TSH; Future  -     CBC with Auto Differential; Future  -     Comprehensive Metabolic Panel; Future  3. Mixed hyperlipidemia  -     Lipid Panel; Future  4. Type 2 diabetes mellitus with hyperglycemia, without long-term current use of insulin (HCC)  -     Hemoglobin A1C; Future  5. Frequency of micturition  -     PSA, Prostatic Specific Antigen; Future  Overall the patient is doing very well his blood pressure is very well-controlled will tomasz check his blood chemistry and kidney function test today    Was excellent and actually improving so we will tomasz repeat that test today and that continue the same has been seen by cardiology and from their input and will check his lipid profile today and continue the same medication        Return in about 6 months (around 2024).         Subjective   SUBJECTIVE/OBJECTIVE:    Lab Review   Lab Results   Component Value Date/Time     2023 07:54 AM     2023 10:58 AM     2022 08:27 AM    K 4.4 2023 07:54 AM    K 4.7 2023 10:58 AM    K 4.3 2022 08:27 AM    K 4.8 2021 05:35 AM    CO2 25 2023 07:54 AM    CO2 25 2023 10:58 AM    CO2 26 2022 08:27 AM    BUN 27 2023 07:54 AM    BUN 19 2023 10:58 AM    BUN 17 2022 08:27 AM    CREATININE 0.9 2023 07:54 AM    CREATININE 0.9 2023 10:58 AM    CREATININE 0.9 2022 08:27 AM    GLUCOSE 120 2023 07:54 AM    GLUCOSE 118 2023 10:58 AM    GLUCOSE 180 2022 08:27 AM    CALCIUM 9.3 2023 07:54 AM    CALCIUM 9.4 2023 10:58 AM    CALCIUM 9.5 2022 08:27 AM     Lab Results   Component Value Date/Time

## 2024-02-09 LAB
EST. AVERAGE GLUCOSE BLD GHB EST-MCNC: 116.9 MG/DL
HBA1C MFR BLD: 5.7 %

## 2024-05-24 ENCOUNTER — HOSPITAL ENCOUNTER (EMERGENCY)
Age: 59
Discharge: HOME | End: 2024-05-24
Payer: MEDICARE

## 2024-05-24 VITALS
WEIGHT: 231 LBS | DIASTOLIC BLOOD PRESSURE: 87 MMHG | BODY MASS INDEX: 33.07 KG/M2 | OXYGEN SATURATION: 95 % | HEART RATE: 69 BPM | RESPIRATION RATE: 16 BRPM | TEMPERATURE: 97.7 F | HEIGHT: 70 IN | SYSTOLIC BLOOD PRESSURE: 125 MMHG
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
## 2024-05-24 PROCEDURE — 99283 EMERGENCY DEPT VISIT LOW MDM: CPT

## 2024-05-24 PROCEDURE — 6370000000 HC RX 637 (ALT 250 FOR IP): Performed by: PHYSICIAN ASSISTANT

## 2024-05-24 RX ORDER — AMOXICILLIN AND CLAVULANATE POTASSIUM 875; 125 MG/1; MG/1
1 TABLET, FILM COATED ORAL 2 TIMES DAILY
Qty: 20 TABLET | Refills: 0 | Status: SHIPPED | OUTPATIENT
Start: 2024-05-24 | End: 2024-06-03

## 2024-05-24 RX ORDER — AMOXICILLIN AND CLAVULANATE POTASSIUM 875; 125 MG/1; MG/1
1 TABLET, FILM COATED ORAL ONCE
Status: COMPLETED | OUTPATIENT
Start: 2024-05-24 | End: 2024-05-24

## 2024-05-24 RX ADMIN — AMOXICILLIN AND CLAVULANATE POTASSIUM 1 TABLET: 875; 125 TABLET, FILM COATED ORAL at 16:16

## 2024-05-24 ASSESSMENT — PAIN SCALES - GENERAL: PAINLEVEL_OUTOF10: 4

## 2024-05-24 ASSESSMENT — ENCOUNTER SYMPTOMS
VOMITING: 0
BACK PAIN: 0
COLOR CHANGE: 0
DIARRHEA: 0
NAUSEA: 0
RESPIRATORY NEGATIVE: 1
CONSTIPATION: 0
ABDOMINAL PAIN: 0
COUGH: 0
SHORTNESS OF BREATH: 0

## 2024-05-24 ASSESSMENT — PAIN DESCRIPTION - ORIENTATION: ORIENTATION: LEFT

## 2024-05-24 ASSESSMENT — PAIN DESCRIPTION - LOCATION: LOCATION: ELBOW

## 2024-05-24 ASSESSMENT — PAIN - FUNCTIONAL ASSESSMENT: PAIN_FUNCTIONAL_ASSESSMENT: 0-10

## 2024-05-24 NOTE — ED PROVIDER NOTES
(Please note that portions of this note were completed with a voice recognition program.  Efforts were made to edit the dictations but occasionally words are mis-transcribed.)    MELYSSA Solis (electronically signed)       Joe Rodriguez PA  05/24/24 [id number]

## 2024-06-13 DIAGNOSIS — E11.65 TYPE 2 DIABETES MELLITUS WITH HYPERGLYCEMIA, WITHOUT LONG-TERM CURRENT USE OF INSULIN (HCC): ICD-10-CM

## 2024-06-13 DIAGNOSIS — I10 ESSENTIAL HYPERTENSION: ICD-10-CM

## 2024-06-13 RX ORDER — LISINOPRIL 5 MG/1
5 TABLET ORAL DAILY
Qty: 90 TABLET | Refills: 3 | Status: SHIPPED | OUTPATIENT
Start: 2024-06-13

## 2024-06-13 NOTE — TELEPHONE ENCOUNTER
Last OV: 24 NPTS    Last labs: 24    Last EK/10/23    Appt scheduled : None  Follow up in 1 yr with Nurse practitioner which pt has done

## 2024-08-08 ENCOUNTER — OFFICE VISIT (OUTPATIENT)
Dept: INTERNAL MEDICINE CLINIC | Age: 59
End: 2024-08-08

## 2024-08-08 VITALS
WEIGHT: 228 LBS | SYSTOLIC BLOOD PRESSURE: 110 MMHG | HEART RATE: 60 BPM | OXYGEN SATURATION: 95 % | HEIGHT: 70 IN | BODY MASS INDEX: 32.64 KG/M2 | DIASTOLIC BLOOD PRESSURE: 60 MMHG

## 2024-08-08 DIAGNOSIS — I10 ESSENTIAL HYPERTENSION: ICD-10-CM

## 2024-08-08 DIAGNOSIS — Z23 NEED FOR PROPHYLACTIC VACCINATION AGAINST STREPTOCOCCUS PNEUMONIAE (PNEUMOCOCCUS): ICD-10-CM

## 2024-08-08 DIAGNOSIS — E11.65 TYPE 2 DIABETES MELLITUS WITH HYPERGLYCEMIA, WITHOUT LONG-TERM CURRENT USE OF INSULIN (HCC): Primary | ICD-10-CM

## 2024-08-08 DIAGNOSIS — E78.2 MIXED HYPERLIPIDEMIA: ICD-10-CM

## 2024-08-08 DIAGNOSIS — I25.10 CORONARY ARTERY DISEASE INVOLVING NATIVE CORONARY ARTERY OF NATIVE HEART WITHOUT ANGINA PECTORIS: ICD-10-CM

## 2024-08-08 DIAGNOSIS — Z12.31 SCREENING MAMMOGRAM FOR HIGH-RISK PATIENT: ICD-10-CM

## 2024-08-08 ASSESSMENT — ENCOUNTER SYMPTOMS
COUGH: 0
SINUS PAIN: 0
ABDOMINAL PAIN: 0
SHORTNESS OF BREATH: 0
CHEST TIGHTNESS: 0
WHEEZING: 0
CONSTIPATION: 0
BLOOD IN STOOL: 0
COLOR CHANGE: 0

## 2024-08-08 NOTE — PROGRESS NOTES
Breath sounds: Normal breath sounds.   Abdominal:      General: Abdomen is flat. Bowel sounds are normal. There is no distension.      Palpations: Abdomen is soft.      Tenderness: There is no abdominal tenderness.   Musculoskeletal:         General: No swelling, tenderness or deformity.      Cervical back: Normal range of motion and neck supple. No rigidity or tenderness.      Right lower leg: No edema.      Left lower leg: No edema.   Lymphadenopathy:      Cervical: No cervical adenopathy.   Skin:     Coloration: Skin is not jaundiced.      Findings: No bruising, erythema or lesion.   Neurological:      General: No focal deficit present.      Mental Status: He is alert and oriented to person, place, and time.      Cranial Nerves: No cranial nerve deficit.      Motor: No weakness.      Gait: Gait normal.            This dictation was generated by voice recognition computer software.  Although all attempts are made to edit the dictation for accuracy, there may be errors in the transcription that are not intended.    An electronic signature was used to authenticate this note.    --Mikey Bergman MD

## 2024-08-09 DIAGNOSIS — I10 ESSENTIAL HYPERTENSION: ICD-10-CM

## 2024-08-09 DIAGNOSIS — E78.2 MIXED HYPERLIPIDEMIA: ICD-10-CM

## 2024-08-09 DIAGNOSIS — E11.65 TYPE 2 DIABETES MELLITUS WITH HYPERGLYCEMIA, WITHOUT LONG-TERM CURRENT USE OF INSULIN (HCC): ICD-10-CM

## 2024-08-09 LAB
ALBUMIN SERPL-MCNC: 4.6 G/DL (ref 3.4–5)
ALBUMIN/GLOB SERPL: 2.1 {RATIO} (ref 1.1–2.2)
ALP SERPL-CCNC: 85 U/L (ref 40–129)
ALT SERPL-CCNC: 16 U/L (ref 10–40)
ANION GAP SERPL CALCULATED.3IONS-SCNC: 11 MMOL/L (ref 3–16)
AST SERPL-CCNC: 15 U/L (ref 15–37)
BASOPHILS # BLD: 0.1 K/UL (ref 0–0.2)
BASOPHILS NFR BLD: 0.7 %
BILIRUB SERPL-MCNC: 0.7 MG/DL (ref 0–1)
BUN SERPL-MCNC: 20 MG/DL (ref 7–20)
CALCIUM SERPL-MCNC: 9.2 MG/DL (ref 8.3–10.6)
CHLORIDE SERPL-SCNC: 103 MMOL/L (ref 99–110)
CHOLEST SERPL-MCNC: 87 MG/DL (ref 0–199)
CO2 SERPL-SCNC: 25 MMOL/L (ref 21–32)
CREAT SERPL-MCNC: 1 MG/DL (ref 0.9–1.3)
CREAT UR-MCNC: 175.5 MG/DL (ref 39–259)
DEPRECATED RDW RBC AUTO: 12.8 % (ref 12.4–15.4)
EOSINOPHIL # BLD: 0.4 K/UL (ref 0–0.6)
EOSINOPHIL NFR BLD: 3.5 %
EST. AVERAGE GLUCOSE BLD GHB EST-MCNC: 116.9 MG/DL
GFR SERPLBLD CREATININE-BSD FMLA CKD-EPI: 87 ML/MIN/{1.73_M2}
GLUCOSE SERPL-MCNC: 127 MG/DL (ref 70–99)
HBA1C MFR BLD: 5.7 %
HCT VFR BLD AUTO: 43.7 % (ref 40.5–52.5)
HDLC SERPL-MCNC: 37 MG/DL (ref 40–60)
HGB BLD-MCNC: 14.9 G/DL (ref 13.5–17.5)
LDLC SERPL CALC-MCNC: 35 MG/DL
LYMPHOCYTES # BLD: 2.8 K/UL (ref 1–5.1)
LYMPHOCYTES NFR BLD: 22.1 %
MCH RBC QN AUTO: 32 PG (ref 26–34)
MCHC RBC AUTO-ENTMCNC: 34 G/DL (ref 31–36)
MCV RBC AUTO: 94 FL (ref 80–100)
MICROALBUMIN UR DL<=1MG/L-MCNC: 1.9 MG/DL
MICROALBUMIN/CREAT UR: 10.8 MG/G (ref 0–30)
MONOCYTES # BLD: 1 K/UL (ref 0–1.3)
MONOCYTES NFR BLD: 7.7 %
NEUTROPHILS # BLD: 8.2 K/UL (ref 1.7–7.7)
NEUTROPHILS NFR BLD: 66 %
PLATELET # BLD AUTO: 239 K/UL (ref 135–450)
PMV BLD AUTO: 10.1 FL (ref 5–10.5)
POTASSIUM SERPL-SCNC: 4.7 MMOL/L (ref 3.5–5.1)
PROT SERPL-MCNC: 6.8 G/DL (ref 6.4–8.2)
RBC # BLD AUTO: 4.65 M/UL (ref 4.2–5.9)
SODIUM SERPL-SCNC: 139 MMOL/L (ref 136–145)
TRIGL SERPL-MCNC: 74 MG/DL (ref 0–150)
TSH SERPL DL<=0.005 MIU/L-ACNC: 0.79 UIU/ML (ref 0.27–4.2)
VLDLC SERPL CALC-MCNC: 15 MG/DL
WBC # BLD AUTO: 12.5 K/UL (ref 4–11)

## 2024-10-18 ENCOUNTER — TELEPHONE (OUTPATIENT)
Dept: INTERNAL MEDICINE CLINIC | Age: 59
End: 2024-10-18

## 2024-11-12 ENCOUNTER — OFFICE VISIT (OUTPATIENT)
Dept: INTERNAL MEDICINE CLINIC | Age: 59
End: 2024-11-12

## 2024-11-12 VITALS
OXYGEN SATURATION: 98 % | BODY MASS INDEX: 33.73 KG/M2 | HEART RATE: 64 BPM | WEIGHT: 235.6 LBS | HEIGHT: 70 IN | DIASTOLIC BLOOD PRESSURE: 70 MMHG | SYSTOLIC BLOOD PRESSURE: 136 MMHG

## 2024-11-12 DIAGNOSIS — E78.2 MIXED HYPERLIPIDEMIA: ICD-10-CM

## 2024-11-12 DIAGNOSIS — E11.65 TYPE 2 DIABETES MELLITUS WITH HYPERGLYCEMIA, WITHOUT LONG-TERM CURRENT USE OF INSULIN (HCC): ICD-10-CM

## 2024-11-12 DIAGNOSIS — Z13.220 SCREENING FOR CHOLESTEROL LEVEL: ICD-10-CM

## 2024-11-12 DIAGNOSIS — I10 ESSENTIAL HYPERTENSION: ICD-10-CM

## 2024-11-12 DIAGNOSIS — Z00.00 MEDICARE ANNUAL WELLNESS VISIT, SUBSEQUENT: Primary | ICD-10-CM

## 2024-11-12 DIAGNOSIS — M72.2 PLANTAR FASCIITIS: ICD-10-CM

## 2024-11-12 DIAGNOSIS — I25.10 CORONARY ARTERY DISEASE INVOLVING NATIVE CORONARY ARTERY OF NATIVE HEART WITHOUT ANGINA PECTORIS: ICD-10-CM

## 2024-11-12 DIAGNOSIS — Z23 NEEDS FLU SHOT: ICD-10-CM

## 2024-11-12 LAB
ALBUMIN SERPL-MCNC: 4.9 G/DL (ref 3.4–5)
ALBUMIN/GLOB SERPL: 2.3 {RATIO} (ref 1.1–2.2)
ALP SERPL-CCNC: 76 U/L (ref 40–129)
ALT SERPL-CCNC: 21 U/L (ref 10–40)
ANION GAP SERPL CALCULATED.3IONS-SCNC: 11 MMOL/L (ref 3–16)
AST SERPL-CCNC: 22 U/L (ref 15–37)
BASOPHILS # BLD: 0.1 K/UL (ref 0–0.2)
BASOPHILS NFR BLD: 1.2 %
BILIRUB SERPL-MCNC: 0.7 MG/DL (ref 0–1)
BUN SERPL-MCNC: 16 MG/DL (ref 7–20)
CALCIUM SERPL-MCNC: 9.7 MG/DL (ref 8.3–10.6)
CHLORIDE SERPL-SCNC: 103 MMOL/L (ref 99–110)
CHOLEST SERPL-MCNC: 91 MG/DL (ref 0–199)
CO2 SERPL-SCNC: 25 MMOL/L (ref 21–32)
CREAT SERPL-MCNC: 0.9 MG/DL (ref 0.9–1.3)
DEPRECATED RDW RBC AUTO: 13.2 % (ref 12.4–15.4)
EOSINOPHIL # BLD: 0.4 K/UL (ref 0–0.6)
EOSINOPHIL NFR BLD: 4.1 %
GFR SERPLBLD CREATININE-BSD FMLA CKD-EPI: >90 ML/MIN/{1.73_M2}
GLUCOSE SERPL-MCNC: 122 MG/DL (ref 70–99)
HCT VFR BLD AUTO: 45.8 % (ref 40.5–52.5)
HDLC SERPL-MCNC: 39 MG/DL (ref 40–60)
HGB BLD-MCNC: 15.3 G/DL (ref 13.5–17.5)
LDLC SERPL CALC-MCNC: 29 MG/DL
LYMPHOCYTES # BLD: 3 K/UL (ref 1–5.1)
LYMPHOCYTES NFR BLD: 27.2 %
MCH RBC QN AUTO: 32 PG (ref 26–34)
MCHC RBC AUTO-ENTMCNC: 33.4 G/DL (ref 31–36)
MCV RBC AUTO: 95.8 FL (ref 80–100)
MONOCYTES # BLD: 0.8 K/UL (ref 0–1.3)
MONOCYTES NFR BLD: 7 %
NEUTROPHILS # BLD: 6.6 K/UL (ref 1.7–7.7)
NEUTROPHILS NFR BLD: 60.5 %
PLATELET # BLD AUTO: 260 K/UL (ref 135–450)
PMV BLD AUTO: 9.9 FL (ref 5–10.5)
POTASSIUM SERPL-SCNC: 4.6 MMOL/L (ref 3.5–5.1)
PROT SERPL-MCNC: 7 G/DL (ref 6.4–8.2)
RBC # BLD AUTO: 4.78 M/UL (ref 4.2–5.9)
SODIUM SERPL-SCNC: 139 MMOL/L (ref 136–145)
TRIGL SERPL-MCNC: 113 MG/DL (ref 0–150)
TSH SERPL DL<=0.005 MIU/L-ACNC: 1.11 UIU/ML (ref 0.27–4.2)
VLDLC SERPL CALC-MCNC: 23 MG/DL
WBC # BLD AUTO: 11 K/UL (ref 4–11)

## 2024-11-12 RX ORDER — ATORVASTATIN CALCIUM 40 MG/1
40 TABLET, FILM COATED ORAL NIGHTLY
Qty: 90 TABLET | Refills: 3 | Status: SHIPPED | OUTPATIENT
Start: 2024-11-12

## 2024-11-12 SDOH — ECONOMIC STABILITY: FOOD INSECURITY: WITHIN THE PAST 12 MONTHS, YOU WORRIED THAT YOUR FOOD WOULD RUN OUT BEFORE YOU GOT MONEY TO BUY MORE.: NEVER TRUE

## 2024-11-12 SDOH — ECONOMIC STABILITY: FOOD INSECURITY: WITHIN THE PAST 12 MONTHS, THE FOOD YOU BOUGHT JUST DIDN'T LAST AND YOU DIDN'T HAVE MONEY TO GET MORE.: NEVER TRUE

## 2024-11-12 SDOH — ECONOMIC STABILITY: INCOME INSECURITY: HOW HARD IS IT FOR YOU TO PAY FOR THE VERY BASICS LIKE FOOD, HOUSING, MEDICAL CARE, AND HEATING?: NOT VERY HARD

## 2024-11-12 ASSESSMENT — PATIENT HEALTH QUESTIONNAIRE - PHQ9
SUM OF ALL RESPONSES TO PHQ QUESTIONS 1-9: 0
SUM OF ALL RESPONSES TO PHQ QUESTIONS 1-9: 0
SUM OF ALL RESPONSES TO PHQ9 QUESTIONS 1 & 2: 0
1. LITTLE INTEREST OR PLEASURE IN DOING THINGS: NOT AT ALL
SUM OF ALL RESPONSES TO PHQ QUESTIONS 1-9: 0
SUM OF ALL RESPONSES TO PHQ QUESTIONS 1-9: 0
2. FEELING DOWN, DEPRESSED OR HOPELESS: NOT AT ALL

## 2024-11-12 ASSESSMENT — LIFESTYLE VARIABLES
HOW OFTEN DO YOU HAVE A DRINK CONTAINING ALCOHOL: NEVER
HOW MANY STANDARD DRINKS CONTAINING ALCOHOL DO YOU HAVE ON A TYPICAL DAY: PATIENT DOES NOT DRINK

## 2024-11-12 ASSESSMENT — ENCOUNTER SYMPTOMS
WHEEZING: 0
SHORTNESS OF BREATH: 0
CONSTIPATION: 0
COLOR CHANGE: 0
SINUS PAIN: 0
COUGH: 0
ABDOMINAL PAIN: 0
CHEST TIGHTNESS: 0
BLOOD IN STOOL: 0

## 2024-11-12 NOTE — PROGRESS NOTES
08/09/2024 08:23 AM    K 4.7 02/08/2024 11:16 AM    K 4.4 08/11/2023 07:54 AM    K 4.8 02/21/2021 05:35 AM    CO2 25 08/09/2024 08:23 AM    CO2 24 02/08/2024 11:16 AM    CO2 25 08/11/2023 07:54 AM    BUN 20 08/09/2024 08:23 AM    BUN 23 02/08/2024 11:16 AM    BUN 27 08/11/2023 07:54 AM    CREATININE 1.0 08/09/2024 08:23 AM    CREATININE 0.9 02/08/2024 11:16 AM    CREATININE 0.9 08/11/2023 07:54 AM    GLUCOSE 127 08/09/2024 08:23 AM    GLUCOSE 106 02/08/2024 11:16 AM    GLUCOSE 120 08/11/2023 07:54 AM    CALCIUM 9.2 08/09/2024 08:23 AM    CALCIUM 9.6 02/08/2024 11:16 AM    CALCIUM 9.3 08/11/2023 07:54 AM     Lab Results   Component Value Date/Time    WBC 12.5 08/09/2024 08:23 AM    WBC 11.4 02/08/2024 11:16 AM    WBC 10.7 08/11/2023 07:54 AM    HGB 14.9 08/09/2024 08:23 AM    HGB 15.3 02/08/2024 11:16 AM    HGB 14.8 08/11/2023 07:54 AM    HCT 43.7 08/09/2024 08:23 AM    HCT 45.7 02/08/2024 11:16 AM    HCT 42.3 08/11/2023 07:54 AM    MCV 94.0 08/09/2024 08:23 AM    MCV 93.8 02/08/2024 11:16 AM    MCV 93.7 08/11/2023 07:54 AM     08/09/2024 08:23 AM     02/08/2024 11:16 AM     08/11/2023 07:54 AM     Lab Results   Component Value Date/Time    CHOL 87 08/09/2024 08:23 AM    CHOL 90 02/08/2024 11:16 AM    CHOL 92 01/23/2023 10:58 AM    TRIG 74 08/09/2024 08:23 AM    TRIG 84 02/08/2024 11:16 AM    TRIG 107 01/23/2023 10:58 AM    HDL 37 08/09/2024 08:23 AM    HDL 39 02/08/2024 11:16 AM    HDL 34 01/23/2023 10:58 AM    HDL 34 09/07/2010 02:45 PM           11/12/2024     8:20 AM 8/8/2024    10:58 AM 5/24/2024     3:38 PM   Vitals   SYSTOLIC 136 110 125   DIASTOLIC 70 60 87   Pulse 64 60 69   Temp   97.7 °F (36.5 °C)   Respirations   16   SpO2 98 % 95 % 95 %   Weight - Scale 235 lb 9.6 oz 228 lb 231 lb   Height 5' 10\" 5' 10\" 5' 10\"   Body Mass Index 33.81 kg/m2 32.71 kg/m2 33.15 kg/m2   Pain Level   4       Diabetes  He presents for his follow-up diabetic visit. He has type 2 diabetes mellitus. His

## 2024-11-12 NOTE — PATIENT INSTRUCTIONS
a range of preventive health benefits. Some of the tests and screenings are paid in full while other may be subject to a deductible, co-insurance, and/or copay.    Some of these benefits include a comprehensive review of your medical history including lifestyle, illnesses that may run in your family, and various assessments and screenings as appropriate.    After reviewing your medical record and screening and assessments performed today your provider may have ordered immunizations, labs, imaging, and/or referrals for you.  A list of these orders (if applicable) as well as your Preventive Care list are included within your After Visit Summary for your review.    Other Preventive Recommendations:    A preventive eye exam performed by an eye specialist is recommended every 1-2 years to screen for glaucoma; cataracts, macular degeneration, and other eye disorders.  A preventive dental visit is recommended every 6 months.  Try to get at least 150 minutes of exercise per week or 10,000 steps per day on a pedometer .  Order or download the FREE \"Exercise & Physical Activity: Your Everyday Guide\" from [employer]. Call 1-674.488.5346 or search [employer] online.  You need 8303-0824 mg of calcium and 9601-9678 IU of vitamin D per day. It is possible to meet your calcium requirement with diet alone, but a vitamin D supplement is usually necessary to meet this goal.  When exposed to the sun, use a sunscreen that protects against both UVA and UVB radiation with an SPF of 30 or greater. Reapply every 2 to 3 hours or after sweating, drying off with a towel, or swimming.  Always wear a seat belt when traveling in a car. Always wear a helmet when riding a bicycle or motorcycle.

## 2024-11-13 LAB
EST. AVERAGE GLUCOSE BLD GHB EST-MCNC: 116.9 MG/DL
HBA1C MFR BLD: 5.7 %

## 2024-12-27 ENCOUNTER — OFFICE VISIT (OUTPATIENT)
Dept: INTERNAL MEDICINE CLINIC | Age: 59
End: 2024-12-27

## 2024-12-27 VITALS
HEIGHT: 70 IN | HEART RATE: 98 BPM | TEMPERATURE: 100.3 F | OXYGEN SATURATION: 95 % | BODY MASS INDEX: 33.5 KG/M2 | DIASTOLIC BLOOD PRESSURE: 64 MMHG | WEIGHT: 234 LBS | SYSTOLIC BLOOD PRESSURE: 120 MMHG

## 2024-12-27 DIAGNOSIS — J10.1 INFLUENZA A: ICD-10-CM

## 2024-12-27 DIAGNOSIS — R05.9 COUGH IN ADULT: Primary | ICD-10-CM

## 2024-12-27 DIAGNOSIS — R06.2 WHEEZING: ICD-10-CM

## 2024-12-27 DIAGNOSIS — I10 ESSENTIAL HYPERTENSION: ICD-10-CM

## 2024-12-27 LAB
INFLUENZA A ANTIBODY: POSITIVE
INFLUENZA B ANTIBODY: NEGATIVE
Lab: NORMAL
QC PASS/FAIL: NORMAL
SARS-COV-2 RDRP RESP QL NAA+PROBE: NEGATIVE

## 2024-12-27 RX ORDER — OSELTAMIVIR PHOSPHATE 75 MG/1
75 CAPSULE ORAL 2 TIMES DAILY
Qty: 20 CAPSULE | Refills: 0 | Status: SHIPPED | OUTPATIENT
Start: 2024-12-27 | End: 2025-01-06

## 2024-12-27 RX ORDER — PREDNISONE 20 MG/1
20 TABLET ORAL DAILY
Qty: 5 TABLET | Refills: 0 | Status: SHIPPED | OUTPATIENT
Start: 2024-12-27 | End: 2025-01-01

## 2024-12-27 RX ORDER — FLUTICASONE PROPIONATE 50 MCG
1 SPRAY, SUSPENSION (ML) NASAL DAILY
Qty: 32 G | Refills: 1 | Status: SHIPPED | OUTPATIENT
Start: 2024-12-27

## 2024-12-27 RX ORDER — BENZONATATE 100 MG/1
100 CAPSULE ORAL 3 TIMES DAILY PRN
Qty: 30 CAPSULE | Refills: 0 | Status: SHIPPED | OUTPATIENT
Start: 2024-12-27 | End: 2025-01-06

## 2024-12-27 ASSESSMENT — ENCOUNTER SYMPTOMS
RHINORRHEA: 1
SHORTNESS OF BREATH: 0
SINUS PRESSURE: 0
COUGH: 1
TROUBLE SWALLOWING: 0
WHEEZING: 0
SORE THROAT: 0

## 2024-12-27 NOTE — ASSESSMENT & PLAN NOTE
Acute new problem  Symptoms for 3 days.  POCT rapid COVID is negative  POCT rapid influenza A/B is positive for A.  Symptomatic care reviewed.  Benzonatate 100 mg 3 times a day as needed for cough  Flonase 1 to 2 sprays each nostril daily  Patient has been advised if symptoms worsen or fail to improve to call the office or go to the ED.

## 2025-01-26 PROBLEM — J10.1 INFLUENZA A: Status: RESOLVED | Noted: 2024-12-27 | Resolved: 2025-01-26

## 2025-02-05 ENCOUNTER — OFFICE VISIT (OUTPATIENT)
Dept: CARDIOLOGY CLINIC | Age: 60
End: 2025-02-05
Payer: MEDICARE

## 2025-02-05 VITALS
DIASTOLIC BLOOD PRESSURE: 62 MMHG | HEART RATE: 63 BPM | OXYGEN SATURATION: 96 % | HEIGHT: 70 IN | SYSTOLIC BLOOD PRESSURE: 124 MMHG | BODY MASS INDEX: 32.5 KG/M2 | WEIGHT: 227 LBS

## 2025-02-05 DIAGNOSIS — E78.2 MIXED HYPERLIPIDEMIA: ICD-10-CM

## 2025-02-05 DIAGNOSIS — I10 ESSENTIAL HYPERTENSION: ICD-10-CM

## 2025-02-05 DIAGNOSIS — I25.10 CORONARY ARTERY DISEASE INVOLVING NATIVE CORONARY ARTERY OF NATIVE HEART WITHOUT ANGINA PECTORIS: Primary | ICD-10-CM

## 2025-02-05 PROCEDURE — 3017F COLORECTAL CA SCREEN DOC REV: CPT | Performed by: NURSE PRACTITIONER

## 2025-02-05 PROCEDURE — 3074F SYST BP LT 130 MM HG: CPT | Performed by: NURSE PRACTITIONER

## 2025-02-05 PROCEDURE — 3078F DIAST BP <80 MM HG: CPT | Performed by: NURSE PRACTITIONER

## 2025-02-05 PROCEDURE — 4004F PT TOBACCO SCREEN RCVD TLK: CPT | Performed by: NURSE PRACTITIONER

## 2025-02-05 PROCEDURE — 93000 ELECTROCARDIOGRAM COMPLETE: CPT | Performed by: NURSE PRACTITIONER

## 2025-02-05 PROCEDURE — G8417 CALC BMI ABV UP PARAM F/U: HCPCS | Performed by: NURSE PRACTITIONER

## 2025-02-05 PROCEDURE — 99214 OFFICE O/P EST MOD 30 MIN: CPT | Performed by: NURSE PRACTITIONER

## 2025-02-05 PROCEDURE — G8427 DOCREV CUR MEDS BY ELIG CLIN: HCPCS | Performed by: NURSE PRACTITIONER

## 2025-02-05 NOTE — PROGRESS NOTES
Excellent Result.   ~LVG with LVEF of 60 and no regional wall motion abnormalities  ~Successful complex angioplasty and stenting of LAD        Assessment:      Diagnosis Orders   1. Coronary artery disease involving native coronary artery of native heart without angina pectoris   ~stable : denies angina   ~s/p PTCA LAD '21  ~normal LVF EF 60%  ~ASA / statin   ~RF : smoking with one pk lasting a month. Working toward completed cessation       2. Essential hypertension   ~controlled   ~lisinopril 5 mg daily  ~mild CLVH       3. Mixed hyperlipidemia   ~HDL and LDL low; last A1c 5.7%  ~atorvastatin 40 mg daily         I had the opportunity to review the clinical symptoms and presentation of Shad Britton.   Plan:     EKG : sinus rhythm   Echocardiogram : surveillance with last test ~ 5 yrs ago  F/U in one year    Overall the patient is stable from CV standpoint    I have addresed the patient's cardiac risk factors and adjusted pharmacologic treatment as needed. In addition, I have reinforced the need for patient directed risk factor modification.    Further evaluation will be based upon the patient's clinical course and testing results.    All questions and concerns were addressed to the patient. Alternatives to my treatment were discussed.      The patient is currently smoking : ~ 1 pk / month; he'll go days without it then something stresses him out. The risks related to smoking were reviewed with the patient. Recommend maintaining a smoke-free lifestyle. Products available for smoking cessation were discussed       Patient is not on a beta-blocker : normal LVF  Patient is on an ace-i/ARB  Patient is on a statin     Antiplatelet therapy has been recommended / prescribed for this patient. Education conducted on adverse reactions including bleeding was discussed.    The patient verbalizes understanding not to stop medications without discussing with us.    Discussed exercise: 30-60 minutes 7 days/week : limited in

## 2025-02-14 ENCOUNTER — HOSPITAL ENCOUNTER (OUTPATIENT)
Age: 60
Discharge: HOME | End: 2025-02-16
Payer: MEDICARE

## 2025-02-14 VITALS
BODY MASS INDEX: 32.5 KG/M2 | SYSTOLIC BLOOD PRESSURE: 156 MMHG | HEIGHT: 70 IN | DIASTOLIC BLOOD PRESSURE: 80 MMHG | WEIGHT: 227 LBS

## 2025-02-14 DIAGNOSIS — I25.10 CORONARY ARTERY DISEASE INVOLVING NATIVE CORONARY ARTERY OF NATIVE HEART WITHOUT ANGINA PECTORIS: ICD-10-CM

## 2025-02-14 LAB
ECHO AO ASC DIAM: 3.8 CM
ECHO AO ASCENDING AORTA INDEX: 1.73 CM/M2
ECHO AO ROOT DIAM: 3.4 CM
ECHO AO ROOT INDEX: 1.55 CM/M2
ECHO AV AREA PEAK VELOCITY: 2.3 CM2
ECHO AV AREA VTI: 2.4 CM2
ECHO AV AREA/BSA PEAK VELOCITY: 1 CM2/M2
ECHO AV AREA/BSA VTI: 1.1 CM2/M2
ECHO AV MEAN GRADIENT: 6 MMHG
ECHO AV MEAN GRADIENT: 6 MMHG
ECHO AV MEAN VELOCITY: 1.2 M/S
ECHO AV PEAK GRADIENT: 12 MMHG
ECHO AV PEAK VELOCITY: 1.7 M/S
ECHO AV VELOCITY RATIO: 0.82
ECHO AV VTI: 34.7 CM
ECHO BSA: 2.25 M2
ECHO LA AREA 2C: 15.3 CM2
ECHO LA AREA 4C: 19.7 CM2
ECHO LA DIAMETER INDEX: 1.77 CM/M2
ECHO LA DIAMETER: 3.9 CM
ECHO LA MAJOR AXIS: 6.1 CM
ECHO LA MINOR AXIS: 5.4 CM
ECHO LA TO AORTIC ROOT RATIO: 1.15
ECHO LA VOL BP: 46 ML (ref 18–58)
ECHO LA VOL MOD A2C: 36 ML (ref 18–58)
ECHO LA VOL MOD A4C: 53 ML (ref 18–58)
ECHO LA VOL/BSA BIPLANE: 21 ML/M2 (ref 16–34)
ECHO LA VOLUME INDEX MOD A2C: 16 ML/M2 (ref 16–34)
ECHO LA VOLUME INDEX MOD A4C: 24 ML/M2 (ref 16–34)
ECHO LV E' LATERAL VELOCITY: 11.4 CM/S
ECHO LV E' SEPTAL VELOCITY: 8.7 CM/S
ECHO LV EDV A2C: 116 ML
ECHO LV EDV A4C: 109 ML
ECHO LV EDV INDEX A4C: 50 ML/M2
ECHO LV EDV NDEX A2C: 53 ML/M2
ECHO LV EJECTION FRACTION A2C: 57 %
ECHO LV EJECTION FRACTION A4C: 56 %
ECHO LV EJECTION FRACTION BIPLANE: 55 % (ref 55–100)
ECHO LV ESV A2C: 50 ML
ECHO LV ESV A4C: 48 ML
ECHO LV ESV INDEX A2C: 23 ML/M2
ECHO LV ESV INDEX A4C: 22 ML/M2
ECHO LV FRACTIONAL SHORTENING: 42 % (ref 28–44)
ECHO LV INTERNAL DIMENSION DIASTOLE INDEX: 2.36 CM/M2
ECHO LV INTERNAL DIMENSION DIASTOLIC: 5.2 CM (ref 4.2–5.9)
ECHO LV INTERNAL DIMENSION SYSTOLIC INDEX: 1.36 CM/M2
ECHO LV INTERNAL DIMENSION SYSTOLIC: 3 CM
ECHO LV IVSD: 1.1 CM (ref 0.6–1)
ECHO LV MASS 2D: 220.8 G (ref 88–224)
ECHO LV MASS INDEX 2D: 100.3 G/M2 (ref 49–115)
ECHO LV POSTERIOR WALL DIASTOLIC: 1.1 CM (ref 0.6–1)
ECHO LV RELATIVE WALL THICKNESS RATIO: 0.42
ECHO LVOT AREA: 2.8 CM2
ECHO LVOT AV VTI INDEX: 0.85
ECHO LVOT DIAM: 1.9 CM
ECHO LVOT MEAN GRADIENT: 5 MMHG
ECHO LVOT PEAK GRADIENT: 8 MMHG
ECHO LVOT PEAK VELOCITY: 1.4 M/S
ECHO LVOT STROKE VOLUME INDEX: 38.1 ML/M2
ECHO LVOT SV: 83.9 ML
ECHO LVOT VTI: 29.6 CM
ECHO MV A VELOCITY: 1.06 M/S
ECHO MV E VELOCITY: 0.83 M/S
ECHO MV E/A RATIO: 0.78
ECHO MV E/E' LATERAL: 7.28
ECHO MV E/E' RATIO (AVERAGED): 8.41
ECHO MV E/E' SEPTAL: 9.54
ECHO PV MAX VELOCITY: 1 M/S
ECHO PV PEAK GRADIENT: 4 MMHG
ECHO RA AREA 4C: 11.3 CM2
ECHO RA END SYSTOLIC VOLUME APICAL 4 CHAMBER INDEX BSA: 8 ML/M2
ECHO RA VOLUME: 17 ML
ECHO RV BASAL DIMENSION: 2.8 CM
ECHO RV FREE WALL PEAK S': 12.9 CM/S
ECHO RV LONGITUDINAL DIMENSION: 7.6 CM
ECHO RV MID DIMENSION: 2.2 CM
ECHO RV TAPSE: 2 CM (ref 1.7–?)

## 2025-02-14 PROCEDURE — 93306 TTE W/DOPPLER COMPLETE: CPT

## 2025-02-14 PROCEDURE — 93306 TTE W/DOPPLER COMPLETE: CPT | Performed by: INTERNAL MEDICINE

## 2025-03-03 ENCOUNTER — TELEPHONE (OUTPATIENT)
Dept: CARDIOLOGY CLINIC | Age: 60
End: 2025-03-03

## 2025-03-13 DIAGNOSIS — E11.65 TYPE 2 DIABETES MELLITUS WITH HYPERGLYCEMIA, WITHOUT LONG-TERM CURRENT USE OF INSULIN (HCC): ICD-10-CM

## 2025-03-18 ENCOUNTER — RESULTS FOLLOW-UP (OUTPATIENT)
Age: 60
End: 2025-03-18

## 2025-03-18 NOTE — TELEPHONE ENCOUNTER
Alexa, wife called to request again the Pt results from his Echo.  Please call Alexa at:  [phone].  T magalis you

## 2025-03-18 NOTE — TELEPHONE ENCOUNTER
Reviewed echos with [facility]. Echo stable from echo in 2021. Ejection fraction or squeeze function remains normal. Valves opening and closing properly. No additional testing indicated at this time.

## 2025-03-18 NOTE — TELEPHONE ENCOUNTER
LMOM for patient to return call in regards to message below           Echo (TTE) complete (PRN contrast/bubble/strain/3D)   Courtney Claire, TONIA -  Result Note  ok

## 2025-03-18 NOTE — TELEPHONE ENCOUNTER
Please let us know echo results as Pt has been waiting since 03/03 for information. Can [facility] review in [facility] absence?

## 2025-03-26 ENCOUNTER — TELEPHONE (OUTPATIENT)
Dept: CARDIOLOGY CLINIC | Age: 60
End: 2025-03-26

## 2025-03-26 NOTE — TELEPHONE ENCOUNTER
Pt called about ECHO results, results given to pt.        Linda Guardado MA  3/26/2025  3:25 PM EDT       LMOM for pt to call back for message from  [facility]. Unable to leave results message per HIPAA.    Joanne Lee MA  3/25/2025  4:42 PM EDT       LMOM for patient to call back    TONIA Yoon CNP  3/18/2025  3:20 PM EDT       ok

## 2025-06-11 DIAGNOSIS — I10 ESSENTIAL HYPERTENSION: ICD-10-CM

## 2025-06-11 RX ORDER — LISINOPRIL 5 MG/1
5 TABLET ORAL DAILY
Qty: 90 TABLET | Refills: 3 | Status: SHIPPED | OUTPATIENT
Start: 2025-06-11

## 2025-06-11 NOTE — TELEPHONE ENCOUNTER
Requested Prescriptions     Pending Prescriptions Disp Refills    lisinopril (PRINIVIL;ZESTRIL) 5 MG tablet [Pharmacy Med Name: LISINOPRIL 5MG TABLETS] 90 tablet 3     Sig: TAKE 1 TABLET BY MOUTH DAILY      LAST OV: 2/5/2025   NEXT OV: RECALL LIST

## 2025-06-23 ENCOUNTER — OFFICE VISIT (OUTPATIENT)
Dept: INTERNAL MEDICINE CLINIC | Age: 60
End: 2025-06-23
Payer: MEDICARE

## 2025-06-23 VITALS
SYSTOLIC BLOOD PRESSURE: 128 MMHG | OXYGEN SATURATION: 96 % | HEIGHT: 70 IN | DIASTOLIC BLOOD PRESSURE: 64 MMHG | BODY MASS INDEX: 32.57 KG/M2 | HEART RATE: 82 BPM

## 2025-06-23 DIAGNOSIS — I10 ESSENTIAL HYPERTENSION: Primary | ICD-10-CM

## 2025-06-23 DIAGNOSIS — E11.65 TYPE 2 DIABETES MELLITUS WITH HYPERGLYCEMIA, WITHOUT LONG-TERM CURRENT USE OF INSULIN (HCC): ICD-10-CM

## 2025-06-23 DIAGNOSIS — E78.2 MIXED HYPERLIPIDEMIA: ICD-10-CM

## 2025-06-23 DIAGNOSIS — R35.0 FREQUENCY OF MICTURITION: ICD-10-CM

## 2025-06-23 DIAGNOSIS — F43.21 GRIEF: ICD-10-CM

## 2025-06-23 DIAGNOSIS — I10 ESSENTIAL HYPERTENSION: ICD-10-CM

## 2025-06-23 PROCEDURE — G2211 COMPLEX E/M VISIT ADD ON: HCPCS | Performed by: INTERNAL MEDICINE

## 2025-06-23 PROCEDURE — 3017F COLORECTAL CA SCREEN DOC REV: CPT | Performed by: INTERNAL MEDICINE

## 2025-06-23 PROCEDURE — 3074F SYST BP LT 130 MM HG: CPT | Performed by: INTERNAL MEDICINE

## 2025-06-23 PROCEDURE — 2022F DILAT RTA XM EVC RTNOPTHY: CPT | Performed by: INTERNAL MEDICINE

## 2025-06-23 PROCEDURE — 99214 OFFICE O/P EST MOD 30 MIN: CPT | Performed by: INTERNAL MEDICINE

## 2025-06-23 PROCEDURE — 4004F PT TOBACCO SCREEN RCVD TLK: CPT | Performed by: INTERNAL MEDICINE

## 2025-06-23 PROCEDURE — G8427 DOCREV CUR MEDS BY ELIG CLIN: HCPCS | Performed by: INTERNAL MEDICINE

## 2025-06-23 PROCEDURE — 3046F HEMOGLOBIN A1C LEVEL >9.0%: CPT | Performed by: INTERNAL MEDICINE

## 2025-06-23 PROCEDURE — G8417 CALC BMI ABV UP PARAM F/U: HCPCS | Performed by: INTERNAL MEDICINE

## 2025-06-23 PROCEDURE — 3078F DIAST BP <80 MM HG: CPT | Performed by: INTERNAL MEDICINE

## 2025-06-23 SDOH — ECONOMIC STABILITY: FOOD INSECURITY: WITHIN THE PAST 12 MONTHS, THE FOOD YOU BOUGHT JUST DIDN'T LAST AND YOU DIDN'T HAVE MONEY TO GET MORE.: NEVER TRUE

## 2025-06-23 SDOH — ECONOMIC STABILITY: FOOD INSECURITY: WITHIN THE PAST 12 MONTHS, YOU WORRIED THAT YOUR FOOD WOULD RUN OUT BEFORE YOU GOT MONEY TO BUY MORE.: NEVER TRUE

## 2025-06-23 ASSESSMENT — ENCOUNTER SYMPTOMS
CHEST TIGHTNESS: 0
COLOR CHANGE: 0
ABDOMINAL PAIN: 0
CONSTIPATION: 0
SHORTNESS OF BREATH: 0
SINUS PAIN: 0
WHEEZING: 0
COUGH: 0
BLOOD IN STOOL: 0

## 2025-06-23 ASSESSMENT — PATIENT HEALTH QUESTIONNAIRE - PHQ9
1. LITTLE INTEREST OR PLEASURE IN DOING THINGS: NOT AT ALL
SUM OF ALL RESPONSES TO PHQ QUESTIONS 1-9: 1
2. FEELING DOWN, DEPRESSED OR HOPELESS: SEVERAL DAYS
SUM OF ALL RESPONSES TO PHQ QUESTIONS 1-9: 1

## 2025-06-23 NOTE — PROGRESS NOTES
Shad Britton (:  1965) is a 59 y.o. male,Established patient, here for evaluation of the following chief complaint(s):  6 Month Follow-Up      Assessment & Plan   ASSESSMENT/PLAN:  1. Essential hypertension  -     TSH; Future  -     CBC with Auto Differential; Future  -     Comprehensive Metabolic Panel; Future  2. Mixed hyperlipidemia  -     Lipid Panel; Future  3. Grief  4. Frequency of micturition  -     PSA, Prostatic Specific Antigen; Future  5. Type 2 diabetes mellitus with hyperglycemia, without long-term current use of insulin (HCC)  -     Hemoglobin A1C; Future    Assessment & Plan  1. Grief reaction.  - Experiencing grief following the passing of his wife on 2025.  - Reports sleeping well and maintaining his appetite.  - Advised to monitor emotional state and report any significant changes that might indicate a transition from grief to depression.  -  is pending, which may provide some closure.    2. Hypertension.  - Blood pressure is stable.  - Continuing current medication regimen, including metformin, atorvastatin, and lisinopril.  - Chemistry panel will be ordered to monitor condition.  - Last blood work was done 6 months ago.    3. Prediabetes.  - Blood glucose levels are within the prediabetic range.  - Continuing metformin.  - A1c test will be conducted today to assess the need for any adjustments in treatment plan.  - Last blood work was done 6 months ago.    4. Prostate screening.  - PSA levels have fluctuated between 0.9, 0.8, and 1.1 over the past year.  - PSA test will be ordered to monitor prostate health.  - Medicare wellness assessment needed at the end of the year.    Return in about 6 months (around 2025) for AWV.         Subjective   SUBJECTIVE/OBJECTIVE:  History of Present Illness  The patient presents for evaluation of grief, hypertension, and prediabetes.    He is currently experiencing a period of grief following the death of his wife on 2025. He

## 2025-06-24 ENCOUNTER — RESULTS FOLLOW-UP (OUTPATIENT)
Dept: INTERNAL MEDICINE CLINIC | Age: 60
End: 2025-06-24

## 2025-06-24 DIAGNOSIS — R97.20 ELEVATED PSA: Primary | ICD-10-CM

## 2025-06-24 LAB
ALBUMIN SERPL-MCNC: 4.6 G/DL (ref 3.4–5)
ALBUMIN/GLOB SERPL: 2.1 {RATIO} (ref 1.1–2.2)
ALP SERPL-CCNC: 68 U/L (ref 40–129)
ALT SERPL-CCNC: 19 U/L (ref 10–40)
ANION GAP SERPL CALCULATED.3IONS-SCNC: 12 MMOL/L (ref 3–16)
AST SERPL-CCNC: 22 U/L (ref 15–37)
BASOPHILS # BLD: 0.1 K/UL (ref 0–0.2)
BASOPHILS NFR BLD: 1 %
BILIRUB SERPL-MCNC: 1.2 MG/DL (ref 0–1)
BUN SERPL-MCNC: 27 MG/DL (ref 7–20)
CALCIUM SERPL-MCNC: 9.8 MG/DL (ref 8.3–10.6)
CHLORIDE SERPL-SCNC: 103 MMOL/L (ref 99–110)
CHOLEST SERPL-MCNC: 105 MG/DL (ref 0–199)
CO2 SERPL-SCNC: 24 MMOL/L (ref 21–32)
CREAT SERPL-MCNC: 1 MG/DL (ref 0.9–1.3)
DEPRECATED RDW RBC AUTO: 13.6 % (ref 12.4–15.4)
EOSINOPHIL # BLD: 0.3 K/UL (ref 0–0.6)
EOSINOPHIL NFR BLD: 2.5 %
EST. AVERAGE GLUCOSE BLD GHB EST-MCNC: 114 MG/DL
GFR SERPLBLD CREATININE-BSD FMLA CKD-EPI: 86 ML/MIN/{1.73_M2}
GLUCOSE SERPL-MCNC: 107 MG/DL (ref 70–99)
HBA1C MFR BLD: 5.6 %
HCT VFR BLD AUTO: 45 % (ref 40.5–52.5)
HDLC SERPL-MCNC: 40 MG/DL (ref 40–60)
HGB BLD-MCNC: 15.4 G/DL (ref 13.5–17.5)
LDLC SERPL CALC-MCNC: 48 MG/DL
LYMPHOCYTES # BLD: 2.4 K/UL (ref 1–5.1)
LYMPHOCYTES NFR BLD: 23.5 %
MCH RBC QN AUTO: 32.2 PG (ref 26–34)
MCHC RBC AUTO-ENTMCNC: 34.3 G/DL (ref 31–36)
MCV RBC AUTO: 93.9 FL (ref 80–100)
MONOCYTES # BLD: 0.7 K/UL (ref 0–1.3)
MONOCYTES NFR BLD: 6.4 %
NEUTROPHILS # BLD: 6.9 K/UL (ref 1.7–7.7)
NEUTROPHILS NFR BLD: 66.6 %
PLATELET # BLD AUTO: 251 K/UL (ref 135–450)
PMV BLD AUTO: 10.3 FL (ref 5–10.5)
POTASSIUM SERPL-SCNC: 4.7 MMOL/L (ref 3.5–5.1)
PROT SERPL-MCNC: 6.8 G/DL (ref 6.4–8.2)
PSA SERPL DL<=0.01 NG/ML-MCNC: 2.1 NG/ML (ref 0–4)
RBC # BLD AUTO: 4.79 M/UL (ref 4.2–5.9)
SODIUM SERPL-SCNC: 139 MMOL/L (ref 136–145)
TRIGL SERPL-MCNC: 84 MG/DL (ref 0–150)
TSH SERPL DL<=0.005 MIU/L-ACNC: 0.97 UIU/ML (ref 0.27–4.2)
VLDLC SERPL CALC-MCNC: 17 MG/DL
WBC # BLD AUTO: 10.3 K/UL (ref 4–11)

## (undated) DEVICE — SOLUTION IV IRRIG WATER 500ML POUR BRL ST 2F7113

## (undated) DEVICE — AIR/WATER CLEANING ADAPTER FOR OLYMPUS® GI ENDOSCOPE: Brand: BULLDOG®

## (undated) DEVICE — TRAP SPEC RETRV CLR PLAS POLYP IN LN SUCT QUIK CTCH

## (undated) DEVICE — ENDOSCOPIC KIT 6X3/16 FT COLON W/ 1.1 OZ 2 GWN W/O BRSH

## (undated) DEVICE — SNARE COLD DIAMOND 15MM THIN

## (undated) DEVICE — VALVE SUCTION AIR H2O SET ORCA POD + DISP

## (undated) DEVICE — BW-412T DISP COMBO CLEANING BRUSH: Brand: SINGLE USE COMBINATION CLEANING BRUSH